# Patient Record
Sex: FEMALE | Race: WHITE | NOT HISPANIC OR LATINO | Employment: OTHER | URBAN - METROPOLITAN AREA
[De-identification: names, ages, dates, MRNs, and addresses within clinical notes are randomized per-mention and may not be internally consistent; named-entity substitution may affect disease eponyms.]

---

## 2017-06-09 LAB — HCV AB SER-ACNC: NEGATIVE

## 2019-06-26 RX ORDER — MAG HYDROX/ALUMINUM HYD/SIMETH 400-400-40
5000 SUSPENSION, ORAL (FINAL DOSE FORM) ORAL DAILY
COMMUNITY
End: 2019-09-17 | Stop reason: ALTCHOICE

## 2019-06-26 RX ORDER — MULTIVITAMIN
1 TABLET ORAL DAILY
COMMUNITY

## 2019-06-26 RX ORDER — ALENDRONATE SODIUM 70 MG/1
70 TABLET ORAL WEEKLY
COMMUNITY
Start: 2019-01-27

## 2019-06-26 RX ORDER — ASCORBATE CALCIUM 500 MG
500 TABLET ORAL DAILY
COMMUNITY
End: 2019-06-28

## 2019-06-26 RX ORDER — CALCIUM CARBONATE 500(1250)
500 TABLET ORAL DAILY
COMMUNITY

## 2019-06-26 RX ORDER — CHOLECALCIFEROL (VITAMIN D3) 125 MCG
CAPSULE ORAL
COMMUNITY
End: 2019-06-28

## 2019-06-26 RX ORDER — PHENOL 1.4 %
AEROSOL, SPRAY (ML) MUCOUS MEMBRANE
COMMUNITY
End: 2019-06-28

## 2019-06-26 RX ORDER — LEVOTHYROXINE SODIUM 88 UG/1
88 TABLET ORAL DAILY
COMMUNITY
Start: 2013-12-19

## 2019-06-28 ENCOUNTER — OFFICE VISIT (OUTPATIENT)
Dept: FAMILY MEDICINE CLINIC | Facility: CLINIC | Age: 74
End: 2019-06-28
Payer: MEDICARE

## 2019-06-28 ENCOUNTER — TELEPHONE (OUTPATIENT)
Dept: FAMILY MEDICINE CLINIC | Facility: CLINIC | Age: 74
End: 2019-06-28

## 2019-06-28 VITALS
OXYGEN SATURATION: 98 % | WEIGHT: 121.2 LBS | TEMPERATURE: 97.3 F | HEIGHT: 63 IN | HEART RATE: 87 BPM | BODY MASS INDEX: 21.48 KG/M2 | DIASTOLIC BLOOD PRESSURE: 80 MMHG | RESPIRATION RATE: 16 BRPM | SYSTOLIC BLOOD PRESSURE: 132 MMHG

## 2019-06-28 DIAGNOSIS — D64.89 ANEMIA DUE TO OTHER CAUSE, NOT CLASSIFIED: ICD-10-CM

## 2019-06-28 DIAGNOSIS — E03.9 HYPOTHYROIDISM, UNSPECIFIED TYPE: ICD-10-CM

## 2019-06-28 DIAGNOSIS — R73.01 IMPAIRED FASTING GLUCOSE: ICD-10-CM

## 2019-06-28 DIAGNOSIS — Z13.220 SCREENING FOR LIPOID DISORDERS: ICD-10-CM

## 2019-06-28 DIAGNOSIS — Z00.00 MEDICARE ANNUAL WELLNESS VISIT, SUBSEQUENT: Primary | ICD-10-CM

## 2019-06-28 PROBLEM — I31.39 PERICARDIAL EFFUSION: Status: ACTIVE | Noted: 2018-01-24

## 2019-06-28 PROBLEM — I31.3 PERICARDIAL EFFUSION: Status: ACTIVE | Noted: 2018-01-24

## 2019-06-28 PROCEDURE — G0439 PPPS, SUBSEQ VISIT: HCPCS | Performed by: FAMILY MEDICINE

## 2019-06-28 RX ORDER — TRAZODONE HYDROCHLORIDE 50 MG/1
1 TABLET ORAL AS NEEDED
COMMUNITY
End: 2019-06-28

## 2019-06-28 RX ORDER — LUTEIN 6 MG
20 TABLET ORAL DAILY
COMMUNITY

## 2019-09-16 ENCOUNTER — ANESTHESIA EVENT (OUTPATIENT)
Dept: PERIOP | Facility: HOSPITAL | Age: 74
End: 2019-09-16
Payer: MEDICARE

## 2019-09-16 RX ORDER — SODIUM CHLORIDE 9 MG/ML
125 INJECTION, SOLUTION INTRAVENOUS CONTINUOUS
Status: CANCELLED | OUTPATIENT
Start: 2019-10-02

## 2019-09-17 ENCOUNTER — APPOINTMENT (OUTPATIENT)
Dept: PREADMISSION TESTING | Facility: HOSPITAL | Age: 74
End: 2019-09-17
Payer: MEDICARE

## 2019-09-17 ENCOUNTER — TRANSCRIBE ORDERS (OUTPATIENT)
Dept: ADMINISTRATIVE | Facility: HOSPITAL | Age: 74
End: 2019-09-17

## 2019-09-17 ENCOUNTER — HOSPITAL ENCOUNTER (OUTPATIENT)
Dept: NON INVASIVE DIAGNOSTICS | Facility: HOSPITAL | Age: 74
Discharge: HOME | End: 2019-09-17
Attending: SURGERY
Payer: MEDICARE

## 2019-09-17 ENCOUNTER — APPOINTMENT (OUTPATIENT)
Dept: LAB | Facility: HOSPITAL | Age: 74
End: 2019-09-17
Attending: SURGERY
Payer: MEDICARE

## 2019-09-17 DIAGNOSIS — C44.311 BASAL CELL CARCINOMA OF SKIN OF NOSE: ICD-10-CM

## 2019-09-17 DIAGNOSIS — Z01.812 PRE-OPERATIVE LABORATORY EXAMINATION: ICD-10-CM

## 2019-09-17 DIAGNOSIS — Z01.812 PRE-OPERATIVE LABORATORY EXAMINATION: Primary | ICD-10-CM

## 2019-09-17 LAB
ANION GAP SERPL CALCULATED.3IONS-SCNC: 7 MMOL/L (ref 4–13)
ATRIAL RATE: 67 BPM
BUN SERPL-MCNC: 15 MG/DL (ref 5–25)
CALCIUM SERPL-MCNC: 9.8 MG/DL (ref 8.3–10.1)
CHLORIDE SERPL-SCNC: 102 MMOL/L (ref 100–108)
CO2 SERPL-SCNC: 32 MMOL/L (ref 21–32)
CREAT SERPL-MCNC: 0.7 MG/DL (ref 0.6–1.3)
ERYTHROCYTE [DISTWIDTH] IN BLOOD BY AUTOMATED COUNT: 15.5 % (ref 11.6–15.1)
GFR SERPL CREATININE-BSD FRML MDRD: 87 ML/MIN/1.73SQ M
GLUCOSE SERPL-MCNC: 90 MG/DL (ref 65–140)
HCT VFR BLD AUTO: 45.2 % (ref 34.8–46.1)
HGB BLD-MCNC: 14.3 G/DL (ref 11.5–15.4)
MCH RBC QN AUTO: 30.6 PG (ref 26.8–34.3)
MCHC RBC AUTO-ENTMCNC: 31.6 G/DL (ref 31.4–37.4)
MCV RBC AUTO: 97 FL (ref 82–98)
P AXIS: -84 DEGREES
PLATELET # BLD AUTO: 223 THOUSANDS/UL (ref 149–390)
PMV BLD AUTO: 10.5 FL (ref 8.9–12.7)
POTASSIUM SERPL-SCNC: 4.1 MMOL/L (ref 3.5–5.3)
PR INTERVAL: 152 MS
QRS AXIS: 7 DEGREES
QRSD INTERVAL: 80 MS
QT INTERVAL: 392 MS
QTC INTERVAL: 414 MS
RBC # BLD AUTO: 4.68 MILLION/UL (ref 3.81–5.12)
SODIUM SERPL-SCNC: 141 MMOL/L (ref 136–145)
T WAVE AXIS: 31 DEGREES
VENTRICULAR RATE: 67 BPM
WBC # BLD AUTO: 6.64 THOUSAND/UL (ref 4.31–10.16)

## 2019-09-17 PROCEDURE — 85027 COMPLETE CBC AUTOMATED: CPT

## 2019-09-17 PROCEDURE — 93005 ELECTROCARDIOGRAM TRACING: CPT

## 2019-09-17 PROCEDURE — 80048 BASIC METABOLIC PNL TOTAL CA: CPT

## 2019-09-17 PROCEDURE — 93010 ELECTROCARDIOGRAM REPORT: CPT | Performed by: INTERNAL MEDICINE

## 2019-09-17 PROCEDURE — 36415 COLL VENOUS BLD VENIPUNCTURE: CPT

## 2019-09-17 RX ORDER — CHOLECALCIFEROL (VITAMIN D3) 50 MCG
2000 TABLET ORAL DAILY
COMMUNITY

## 2019-09-17 RX ORDER — ACETAMINOPHEN 500 MG
500 TABLET ORAL EVERY 6 HOURS PRN
COMMUNITY

## 2019-09-17 NOTE — PRE-PROCEDURE INSTRUCTIONS
Pre-Surgery Instructions:   Medication Instructions    acetaminophen (TYLENOL) 500 mg tablet Patient was instructed by Physician and understands   alendronate (FOSAMAX) 70 mg tablet Patient was instructed by Physician and understands   Calcium (RA CALCIUM) 500 MG tablet Patient was instructed by Physician and understands   Cholecalciferol (VITAMIN D) 2000 units tablet Patient was instructed by Physician and understands   levothyroxine (SYNTHROID) 88 mcg tablet Patient was instructed by Physician and understands   Lutein 20 MG TABS Patient was instructed by Physician and understands   Multiple Vitamin (MULTI-VITAMIN DAILY) TABS Patient was instructed by Physician and understands  Patient seen by Sheila Maxwell instructed *to take*synthroid with a sip of water the morning of surgery  Patient given/ instructed on use of chlorhexidine soap per hospital protocol    Patient instructed to stop all ASA, NSAIDS, vitamins and herbal supplements one week prior to surgery or per Dr Anne-Marie Montenegro

## 2019-09-17 NOTE — ANESTHESIA PREPROCEDURE EVALUATION
Review of Systems/Medical History  Patient summary reviewed  Chart reviewed  No history of anesthetic complications     Cardiovascular  Valvular heart disease , mitral valve prolapse, CAD ,   Comment: Hx pericarditis  HPI:  I had the pleasure of seeing George Fernandez in follow up today for her prior syncopal event, noncompressive pericardial effusion, and mitral valve prolapse with mild MR  She was previously hospitalized after having a syncopal event after donating blood  Her syncopal event only lasted a few seconds  The time she had her event, she was witnessed to be pale, felt lightheaded dizzy and weak  She an inpatient evaluation that did not showed any other reason for event other than a vagal event and possibly some low intravascular volume  Since our visit last year she has been doing very well from cardiac standpoint  She denies any further syncopal or near syncopal events  She denies any anginal chest pain, shortness of breath, palpitations, orthopnea, PND, or claudication  She recently underwent surgery on her left foot and is recovering from that now  Prior to her foot surgery, she was exercising rather routinely with both aerobic, yoga, and weight training and without difficulty  EKG: Normal left ventricular chamber size  Normal left ventricular systolic     function  Normal regional wall motion  Normal left ventricular wall     thickness  Estimated left ventricular ejection fraction is 65%       Normal right ventricular size and function       Small anterior pericardial effusion without evidence of hemodynamic     compromise      Mild mitral annular calcification  Thickened mitral valve leaflets  Evidence     of mild posterior leaflet prolapse  No mitral stenosis  Mild mitral     regurgitation      Mild tricuspid regurgitation      Mild diastolic dysfunction with normal filling pressures      Visually Estimated LV Ejection Fraction is:65% ,  Pulmonary  Smoker ex-smoker  , GI/Hepatic  Negative GI/hepatic ROS          Negative  ROS        Endo/Other  History of thyroid disease (thyroidectomy for CA) , hypothyroidism,      GYN  Negative gynecology ROS          Hematology  Negative hematology ROS      Musculoskeletal       Neurology  Negative neurology ROS      Psychology   Negative psychology ROS              Physical Exam    Airway    Mallampati score: II  TM Distance: >3 FB  Neck ROM: full     Dental   No notable dental hx     Cardiovascular  Rhythm: regular, Rate: normal,     Pulmonary  Pulmonary exam normal Breath sounds clear to auscultation,     Other Findings        Anesthesia Plan  ASA Score- 2     Anesthesia Type- general with ASA Monitors  Additional Monitors:   Airway Plan:         Plan Factors-Patient not instructed to abstain from smoking on day of procedure  Patient did not smoke on day of surgery  Induction- intravenous  Postoperative Plan-     Informed Consent- Anesthetic plan and risks discussed with patient

## 2019-10-02 ENCOUNTER — ANESTHESIA (OUTPATIENT)
Dept: PERIOP | Facility: HOSPITAL | Age: 74
End: 2019-10-02
Payer: MEDICARE

## 2019-10-02 ENCOUNTER — HOSPITAL ENCOUNTER (OUTPATIENT)
Facility: HOSPITAL | Age: 74
Setting detail: OUTPATIENT SURGERY
Discharge: HOME | End: 2019-10-02
Attending: SURGERY | Admitting: SURGERY
Payer: MEDICARE

## 2019-10-02 VITALS
OXYGEN SATURATION: 98 % | WEIGHT: 122 LBS | SYSTOLIC BLOOD PRESSURE: 141 MMHG | TEMPERATURE: 97.5 F | RESPIRATION RATE: 18 BRPM | HEIGHT: 63 IN | HEART RATE: 70 BPM | BODY MASS INDEX: 21.62 KG/M2 | DIASTOLIC BLOOD PRESSURE: 74 MMHG

## 2019-10-02 DIAGNOSIS — C44.311 BASAL CELL CARCINOMA (BCC) OF SUPRATIP OF NOSE: Primary | ICD-10-CM

## 2019-10-02 RX ORDER — HYDROCODONE BITARTRATE AND ACETAMINOPHEN 5; 325 MG/1; MG/1
2 TABLET ORAL EVERY 4 HOURS PRN
Status: DISCONTINUED | OUTPATIENT
Start: 2019-10-02 | End: 2019-10-02 | Stop reason: HOSPADM

## 2019-10-02 RX ORDER — CEFAZOLIN SODIUM 1 G/50ML
1000 SOLUTION INTRAVENOUS ONCE
Status: COMPLETED | OUTPATIENT
Start: 2019-10-02 | End: 2019-10-02

## 2019-10-02 RX ORDER — HYDROCODONE BITARTRATE AND ACETAMINOPHEN 5; 325 MG/1; MG/1
1 TABLET ORAL EVERY 6 HOURS PRN
Qty: 20 TABLET | Refills: 0 | Status: SHIPPED | OUTPATIENT
Start: 2019-10-02 | End: 2019-10-12

## 2019-10-02 RX ORDER — HYDROCODONE BITARTRATE AND ACETAMINOPHEN 5; 325 MG/1; MG/1
1 TABLET ORAL EVERY 4 HOURS PRN
Status: DISCONTINUED | OUTPATIENT
Start: 2019-10-02 | End: 2019-10-02 | Stop reason: HOSPADM

## 2019-10-02 RX ORDER — HYDROCODONE BITARTRATE AND ACETAMINOPHEN 5; 325 MG/1; MG/1
1 TABLET ORAL EVERY 4 HOURS PRN
Status: DISCONTINUED | OUTPATIENT
Start: 2019-10-02 | End: 2019-10-02

## 2019-10-02 RX ORDER — FENTANYL CITRATE 50 UG/ML
INJECTION, SOLUTION INTRAMUSCULAR; INTRAVENOUS AS NEEDED
Status: DISCONTINUED | OUTPATIENT
Start: 2019-10-02 | End: 2019-10-02 | Stop reason: SURG

## 2019-10-02 RX ORDER — PROPOFOL 10 MG/ML
INJECTION, EMULSION INTRAVENOUS AS NEEDED
Status: DISCONTINUED | OUTPATIENT
Start: 2019-10-02 | End: 2019-10-02 | Stop reason: SURG

## 2019-10-02 RX ORDER — ONDANSETRON 2 MG/ML
INJECTION INTRAMUSCULAR; INTRAVENOUS AS NEEDED
Status: DISCONTINUED | OUTPATIENT
Start: 2019-10-02 | End: 2019-10-02 | Stop reason: SURG

## 2019-10-02 RX ORDER — FENTANYL CITRATE/PF 50 MCG/ML
50 SYRINGE (ML) INJECTION
Status: DISCONTINUED | OUTPATIENT
Start: 2019-10-02 | End: 2019-10-02 | Stop reason: HOSPADM

## 2019-10-02 RX ORDER — DEXAMETHASONE SODIUM PHOSPHATE 4 MG/ML
INJECTION, SOLUTION INTRA-ARTICULAR; INTRALESIONAL; INTRAMUSCULAR; INTRAVENOUS; SOFT TISSUE AS NEEDED
Status: DISCONTINUED | OUTPATIENT
Start: 2019-10-02 | End: 2019-10-02 | Stop reason: SURG

## 2019-10-02 RX ORDER — LIDOCAINE HYDROCHLORIDE 20 MG/ML
INJECTION, SOLUTION EPIDURAL; INFILTRATION; INTRACAUDAL; PERINEURAL AS NEEDED
Status: DISCONTINUED | OUTPATIENT
Start: 2019-10-02 | End: 2019-10-02 | Stop reason: SURG

## 2019-10-02 RX ORDER — EPHEDRINE SULFATE 50 MG/ML
INJECTION INTRAVENOUS AS NEEDED
Status: DISCONTINUED | OUTPATIENT
Start: 2019-10-02 | End: 2019-10-02 | Stop reason: SURG

## 2019-10-02 RX ORDER — CEPHALEXIN 500 MG/1
500 CAPSULE ORAL
Qty: 30 CAPSULE | Refills: 0 | Status: SHIPPED | OUTPATIENT
Start: 2019-10-02 | End: 2019-10-12

## 2019-10-02 RX ORDER — LIDOCAINE HYDROCHLORIDE AND EPINEPHRINE 10; 10 MG/ML; UG/ML
INJECTION, SOLUTION INFILTRATION; PERINEURAL AS NEEDED
Status: DISCONTINUED | OUTPATIENT
Start: 2019-10-02 | End: 2019-10-02 | Stop reason: HOSPADM

## 2019-10-02 RX ORDER — SODIUM CHLORIDE 9 MG/ML
125 INJECTION, SOLUTION INTRAVENOUS CONTINUOUS
Status: DISCONTINUED | OUTPATIENT
Start: 2019-10-02 | End: 2019-10-02 | Stop reason: HOSPADM

## 2019-10-02 RX ORDER — HYDROCODONE BITARTRATE AND ACETAMINOPHEN 5; 325 MG/1; MG/1
2 TABLET ORAL EVERY 4 HOURS PRN
Status: DISCONTINUED | OUTPATIENT
Start: 2019-10-02 | End: 2019-10-02

## 2019-10-02 RX ORDER — MIDAZOLAM HYDROCHLORIDE 1 MG/ML
INJECTION INTRAMUSCULAR; INTRAVENOUS AS NEEDED
Status: DISCONTINUED | OUTPATIENT
Start: 2019-10-02 | End: 2019-10-02 | Stop reason: SURG

## 2019-10-02 RX ORDER — ONDANSETRON 2 MG/ML
4 INJECTION INTRAMUSCULAR; INTRAVENOUS ONCE AS NEEDED
Status: DISCONTINUED | OUTPATIENT
Start: 2019-10-02 | End: 2019-10-02 | Stop reason: HOSPADM

## 2019-10-02 RX ORDER — GINSENG 100 MG
CAPSULE ORAL AS NEEDED
Status: DISCONTINUED | OUTPATIENT
Start: 2019-10-02 | End: 2019-10-02 | Stop reason: HOSPADM

## 2019-10-02 RX ADMIN — SODIUM CHLORIDE 125 ML/HR: 0.9 INJECTION, SOLUTION INTRAVENOUS at 12:36

## 2019-10-02 RX ADMIN — LIDOCAINE HYDROCHLORIDE 100 MG: 20 INJECTION, SOLUTION EPIDURAL; INFILTRATION; INTRACAUDAL; PERINEURAL at 13:35

## 2019-10-02 RX ADMIN — MIDAZOLAM 1 MG: 1 INJECTION INTRAMUSCULAR; INTRAVENOUS at 13:30

## 2019-10-02 RX ADMIN — CEFAZOLIN SODIUM 1000 MG: 1 SOLUTION INTRAVENOUS at 13:21

## 2019-10-02 RX ADMIN — DEXAMETHASONE SODIUM PHOSPHATE 6 MG: 4 INJECTION, SOLUTION INTRAMUSCULAR; INTRAVENOUS at 13:54

## 2019-10-02 RX ADMIN — PHENYLEPHRINE HYDROCHLORIDE 100 MCG: 10 INJECTION INTRAVENOUS at 14:02

## 2019-10-02 RX ADMIN — SODIUM CHLORIDE: 0.9 INJECTION, SOLUTION INTRAVENOUS at 14:30

## 2019-10-02 RX ADMIN — EPHEDRINE SULFATE 5 MG: 50 INJECTION, SOLUTION INTRAVENOUS at 14:11

## 2019-10-02 RX ADMIN — PHENYLEPHRINE HYDROCHLORIDE 100 MCG: 10 INJECTION INTRAVENOUS at 14:12

## 2019-10-02 RX ADMIN — PHENYLEPHRINE HYDROCHLORIDE 100 MCG: 10 INJECTION INTRAVENOUS at 14:06

## 2019-10-02 RX ADMIN — FENTANYL CITRATE 50 MCG: 50 INJECTION, SOLUTION INTRAMUSCULAR; INTRAVENOUS at 13:44

## 2019-10-02 RX ADMIN — MIDAZOLAM 1 MG: 1 INJECTION INTRAMUSCULAR; INTRAVENOUS at 13:34

## 2019-10-02 RX ADMIN — FENTANYL CITRATE 25 MCG: 50 INJECTION, SOLUTION INTRAMUSCULAR; INTRAVENOUS at 13:34

## 2019-10-02 RX ADMIN — PROPOFOL 200 MG: 10 INJECTION, EMULSION INTRAVENOUS at 13:35

## 2019-10-02 RX ADMIN — ONDANSETRON 4 MG: 2 INJECTION INTRAMUSCULAR; INTRAVENOUS at 13:54

## 2019-10-02 NOTE — ANESTHESIA POSTPROCEDURE EVALUATION
Post-Op Assessment Note    CV Status:  Stable  Pain Score: 2    Pain management: adequate     Mental Status:  Alert and awake   Hydration Status:  Euvolemic and stable   PONV Controlled:  Controlled   Airway Patency:  Patent   Post Op Vitals Reviewed: Yes      Staff: Anesthesiologist           /71 (10/02/19 1528)    Temp      Pulse 76 (10/02/19 1528)   Resp 18 (10/02/19 1528)    SpO2 97 % (10/02/19 1528)

## 2019-10-02 NOTE — INTERVAL H&P NOTE
H&P reviewed  After examining the patient I find no changes in the patients condition since the H&P had been written      Vitals:    10/02/19 1224   BP: 160/80   Pulse: 80   Resp: 16   Temp: 97 7 °F (36 5 °C)   SpO2: 100%

## 2019-10-02 NOTE — DISCHARGE INSTRUCTIONS
[facility]  Postoperative Instructions for Outpatient Surgery  [address] [facility], [address] [facility], [address] [facility], [facility], [address] [facility] Megan / Gopi Marino  / www [email or web address]      These  instructions are being provided by you doctor to give you basic guidelines during you post-op recovery  Please let our office know your contact information has changed  Please call the office today to schedule a post operative appointment, and tell the office staff  that you doctor needs see you in our office in 7-10 days  Dressing:          Keep nose dressing dry    Apply ice as needed       Bathing  Keep dressing dry nose    Showering permitted              Medication    Resume preparative medication  Skin glue was applied to area  Motrin or Tylenol is OK    Other Instruction:        Activities  No bending , lifting, or straining    You may drive when you are off you pain medications  Walking permitted  Bruising, and welling I expected  incision and surrounding area  It is normal to have a slight fever after surgery  If the fever I above 101 5 please call our office  If you have a drain, leaking around the drain it may occur  The normal  Occasionally, a drain may clog  If this happens carefully remove the bulb and try milking the obstruction  out of the tubing  Garments after liposuction will become soiled  You should protect area where you will sitting or lying  The majority of the drainage should subside within 48 hrs  Do Not remove garment unless otherwise instructed  A side effect of the pain medication is constipation  If this dose happen your doctor recommends that you take Senokot, Colace or something over the counter for this  Do not hesitate to call the office at [phone] if you have any questions about your surgery  The nursing staff will be glad to assist you in any possible way   If it is necessary for you to contract a doctor when the office is closed or on the weekend, please call [phone] and it will direct you to the answering service  A physician will contact you to assist you with any problems or questions  Cephalexin (By mouth)   Cephalexin (iwr-b-BGY-in)  Treats infections  This medicine is a cephalosporin antibiotic  Brand Name(s): Daxbia, Keflex   There may be other brand names for this medicine  When This Medicine Should Not Be Used: This medicine is not right for everyone  Do not use this medicine if you had an allergic reaction to cephalexin or another cephalosporin medicine  How to Use This Medicine:   Capsule, Tablet, Tablet for Suspension, Liquid  · Your doctor will tell you how much medicine to use  Do not use more than directed  · Read and follow the patient instructions that come with this medicine  Talk to your doctor or pharmacist if you have any questions  · You may take your medicine with food or milk to avoid stomach upset  · Oral liquid: Shake well just before use  Measure the oral liquid medicine with a marked measuring spoon, oral syringe, or medicine cup  · Take all of the medicine in your prescription to clear up your infection, even if you feel better after the first few doses  · Missed dose: Take a dose as soon as you remember  If it is almost time for your next dose, wait until then and take a regular dose  Do not take extra medicine to make up for a missed dose  · Capsule or tablet: Store at room temperature away from heat, moisture, and direct light  · Oral liquid: Store in the refrigerator for 14 days  After 14 days, throw away any unused medicine  Do not freeze  Drugs and Foods to Avoid:   Ask your doctor or pharmacist before using any other medicine, including over-the-counter medicines, vitamins, and herbal products  · Some medicines and foods can affect how cephalexin works   Tell your doctor if you are also using  ¨ Metformin  ¨ Probenecid  Warnings While Using This Medicine:   · Tell your doctor if you are pregnant or breastfeeding, or if you have kidney disease, liver disease, or a history of digestive problems, such as colitis  Tell your doctor if you are allergic to penicillin  · This medicine can cause diarrhea  Call your doctor if the diarrhea becomes severe, does not stop, or is bloody  Do not take any medicine to stop diarrhea until you have talked to your doctor  Diarrhea can occur 2 months or more after you stop taking this medicine  · Tell any doctor or dentist who treats you that you are using this medicine  This medicine may affect certain medical test results  · Call your doctor if your symptoms do not improve or if they get worse  · Keep all medicine out of the reach of children  Never share your medicine with anyone  Possible Side Effects While Using This Medicine:   Call your doctor right away if you notice any of these side effects:  · Allergic reaction: Itching or hives, swelling in your face or hands, swelling or tingling in your mouth or throat, chest tightness, trouble breathing  · Blistering, peeling, red skin rash  · Severe diarrhea, especially if bloody or ongoing  · Severe stomach pain, vomiting  If you notice these less serious side effects, talk with your doctor:   · Mild diarrhea or nausea  If you notice other side effects that you think are caused by this medicine, tell your doctor  Call your doctor for medical advice about side effects  You may report side effects to FDA at 1-711-FDA-3092  © 2017 [facility] Information is for End User's use only and may not be sold, redistributed or otherwise used for commercial purposes  The above information is an  only  It is not intended as medical advice for individual conditions or treatments  Talk to your doctor, nurse or pharmacist before following any medical regimen to see if it is safe and effective for you        Hydrocodone/Acetaminophen (By mouth)   Acetaminophen (a-uxix-o-MIN-oh-fen), Hydrocodone Bitartrate (wau-crty-ROO-done bye-TAR-trate)  Treats pain  This medicine contains a narcotic pain reliever  Brand Name(s): Hycet, Lorcet, Lorcet HD, Lorcet Plus, Lortab 10/325, Lortab 5/325, Lortab 7 5/325, Lortab Elixir, Norco, Verdrocet, Vicodin, Vicodin ES, Vicodin HP, Xodol, Xodol 5/300   There may be other brand names for this medicine  When This Medicine Should Not Be Used: This medicine is not right for everyone  Do not use it if you had an allergic reaction to acetaminophen, hydrocodone, or other narcotic medicines, or stomach or bowel blockage (including paralytic ileus)  How to Use This Medicine:   Capsule, Liquid, Tablet  · Your doctor will tell you how much medicine to use  Do not use more than directed  · An overdose can be dangerous  Follow directions carefully so you do not get too much medicine at one time  · Oral liquid: Measure the oral liquid medicine with a marked measuring spoon, oral syringe, or medicine cup  · Drink plenty of liquids to help avoid constipation  · This medicine should come with a Medication Guide  Ask your pharmacist for a copy if you do not have one  · Missed dose: Take a dose as soon as you remember  If it is almost time for your next dose, wait until then and take a regular dose  Do not take extra medicine to make up for a missed dose  · Store the medicine in a closed container at room temperature, away from heat, moisture, and direct light  Flush any unused Norco® tablets down the toilet  Drugs and Foods to Avoid:   Ask your doctor or pharmacist before using any other medicine, including over-the-counter medicines, vitamins, and herbal products  · Do not use this medicine if you are using or have used an MAO inhibitor within the past 14 days  · Some medicines can affect how hydrocodone/acetaminophen works   Tell your doctor if you are using any of the following:   ¨ Carbamazepine, erythromycin, ketoconazole, mirtazapine, phenytoin, rifampin, ritonavir, tramadol, trazodone  ¨ Diuretic (water pill)  ¨ Medicine to treat depression or mental health problems  ¨ Medicine to treat migraine headaches  ¨ Phenothiazine medicine  · Tell your doctor if you use anything else that makes you sleepy  Some examples are allergy medicine, narcotic pain medicine, and alcohol  Tell your doctor if you are using buprenorphine, butorphanol, nalbuphine, pentazocine, or a muscle relaxer  · Do not drink alcohol while you are using this medicine  Acetaminophen can damage your liver, and your risk is higher if you also drink alcohol  Warnings While Using This Medicine:   · Tell your doctor if you are pregnant or breastfeeding, or if you have kidney disease, liver disease, lung or breathing problems, gallbladder or pancreas problems, an underactive thyroid, Davidson disease, prostate problems, trouble urinating, stomach problems, or a history of head injury or brain tumor, seizures, alcohol or drug addiction  · This medicine may cause the following problems:   ¨ High risk of overdose, which can lead to death  ¨ Respiratory depression (serious breathing problem that can be life-threatening)  ¨ Liver problems  ¨ Serious skin reactions  ¨ Serotonin syndrome (when used with certain medicines)  · This medicine can be habit-forming  Do not use more than your prescribed dose  Call your doctor if you think your medicine is not working  · This medicine may make you dizzy or drowsy  Do not drive or doing anything else that could be dangerous until you know how this medicine affects you  · This medicine contains acetaminophen  Read the labels of all other medicines you are using to see if they also contain acetaminophen, or ask your doctor or pharmacist  Milli Gonzalez not use more than 4 grams (4,000 milligrams) total of acetaminophen in one day  · Tell any doctor or dentist who treats you that you are using this medicine   This medicine may affect certain medical test results  · This medicine may cause constipation, especially with long-term use  Ask your doctor if you should use a laxative to prevent and treat constipation  · This medicine could cause infertility  Talk with your doctor before using this medicine if you plan to have children  · Keep all medicine out of the reach of children  Never share your medicine with anyone  Possible Side Effects While Using This Medicine:   Call your doctor right away if you notice any of these side effects:  · Allergic reaction: Itching or hives, swelling in your face or hands, swelling or tingling in your mouth or throat, chest tightness, trouble breathing  · Anxiety, restlessness, fast heartbeat, fever, sweating, muscle spasms, twitching, diarrhea, seeing or hearing things that are not there  · Blistering, peeling, red skin rash  · Blue lips, fingernails, or skin  · Dark urine or pale stools, loss of appetite, nausea or vomiting, stomach pain, yellow skin or eyes  · Extreme weakness, shallow breathing, slow heartbeat, sweating, seizures, cold or clammy skin  · Lightheadedness, dizziness, fainting  If you notice these less serious side effects, talk with your doctor:   · Constipation, nausea, vomiting  · Tiredness or sleepiness  If you notice other side effects that you think are caused by this medicine, tell your doctor  Call your doctor for medical advice about side effects  You may report side effects to FDA at 2-907-FDA-2601  © 2017 [address] Information is for End User's use only and may not be sold, redistributed or otherwise used for commercial purposes  The above information is an  only  It is not intended as medical advice for individual conditions or treatments  Talk to your doctor, nurse or pharmacist before following any medical regimen to see if it is safe and effective for you

## 2019-10-02 NOTE — DISCHARGE SUMMARY
PLASTIC, RECONSTRUCTIVE, & HAND SURGERY   Discharge Summary  Date of Admission:   10/2/2019  Date of Discharge:   10/02/19  Attending:  Gabino Gaviria MD  Principal/Final Diagnosis:   Basal cell carcinoma of skin of nose [C44 311]  Principal Procedure:   NOSE MOHS RECONSTRUCTION (N/A Nose)  Discharge Medications:  See after visit summary for reconciled discharge medications provided to patient and family  Reason for Admission:  Juliann Emery was electively admitted to undergo the above named procedure on 10/2/2019 as an outpatient  Hospital Course:  Patient underwent the above named procedure on the day of admission without complications  They were discharged home the same day  Disposition:  To home in care of self and family    Condition:  Good  Follow up:  Patient with follow up in the office with Dr Gabino Gaviria MD in 1 week(s) or as scheduled per his office  Gabino Gaviria MD  10/2/2019 1:13 PM

## 2019-10-02 NOTE — OP NOTE
OPERATIVE REPORT  PATIENT NAME: Xenia Connell    :  1945  MRN: [id number]  Pt Location: [facility] OR ROOM 05    SURGERY DATE: 10/2/2019    Surgeon(s) and Role:     * Zulma Cole MD - Primary     * Yousuf Primer - Assisting    Preop Diagnosis:  Basal cell carcinoma of skin of nose [C44 311]    Post-Op Diagnosis Codes: * Basal cell carcinoma of skin of nose [C44 311]    Procedure(s) (LRB):  NOSE MOHS RECONSTRUCTION (N/A)   I&D left lateral nose defect secondary from mohs 1 5 x 1 0 cm  Reconstruction with left cheek flap based on lateral nasal artery  Specimen(s):  * No specimens in log *    Estimated Blood Loss:   Minimal    Drains:  * No LDAs found *    Anesthesia Type:   Choice    Operative Indications:  Basal cell carcinoma of skin of nose [C44 311]  Mohs defect  1 5 x 1 0 cm    Operative Findings:  none    Complications:   None    Procedure and Technique:  Patient identified correctly on the table  Intubated by anesthesia  Prepped and draped in sterile surgical fashion  First started by cleaning the wound by removing the old dressing  We then debrided the skin edges well as the bed with 15 blade and electrocautery  Total size of the defect measured 1 5 x 1 cm  At this point we marked out a left cheek flap  Incisions were made along the nasal labial line and then superiorly onto the nose  Dissection occurred sub SMAS along the left nose and cheek until we identified the left lateral nasal artery  The artery was dissected out and kept intact with our skin and fascial flap  At this point the entire flap was elevated and inset into the defect  This was then closed with 3-0, 4-0 PDS, 6-0 Prolene  Patient was dressed with antibiotic ointment  We had good symmetry of the nose  All counts were correct x2  The patient was awakened from surgery taken recovery     I was present for the entire procedure    Patient Disposition:  PACU  room stable condition      SIGNATURE: Zulma Cole MD  DATE: October 2, 2019  TIME: 2:29 PM

## 2020-01-28 ENCOUNTER — TELEPHONE (OUTPATIENT)
Dept: FAMILY MEDICINE CLINIC | Facility: CLINIC | Age: 75
End: 2020-01-28

## 2020-01-28 NOTE — TELEPHONE ENCOUNTER
Spoke to patient and obtained the below Pre-op information:    Name of procedure: L foot surgery  Date of procedure (within 27 days):3/13/20  Surgeon: Dr Kimberli Morales  Location of procedure (hospital or out patient facility name): [facility] date/location/ type (Which labs? EKG?  CXR?): lab to be done, no EKG  Records (on epic or requested): EPIC  Type of anaesthesia:  general  Paperwork to complete for Pre-op (patient bringing vs  calling office to obtain prior to appointment): pt to bring form  Pre-op appointment scheduled (date/time): 3/3/20

## 2020-02-25 ENCOUNTER — OFFICE VISIT (OUTPATIENT)
Dept: FAMILY MEDICINE CLINIC | Facility: CLINIC | Age: 75
End: 2020-02-25
Payer: MEDICARE

## 2020-02-25 VITALS
TEMPERATURE: 98.7 F | RESPIRATION RATE: 14 BRPM | DIASTOLIC BLOOD PRESSURE: 84 MMHG | HEART RATE: 82 BPM | BODY MASS INDEX: 22.89 KG/M2 | WEIGHT: 129.2 LBS | OXYGEN SATURATION: 98 % | SYSTOLIC BLOOD PRESSURE: 136 MMHG | HEIGHT: 63 IN

## 2020-02-25 DIAGNOSIS — I05.9 MITRAL VALVE DISORDER: ICD-10-CM

## 2020-02-25 DIAGNOSIS — M81.0 AGE-RELATED OSTEOPOROSIS WITHOUT CURRENT PATHOLOGICAL FRACTURE: ICD-10-CM

## 2020-02-25 DIAGNOSIS — Z01.818 PREOPERATIVE CLEARANCE: Primary | ICD-10-CM

## 2020-02-25 DIAGNOSIS — M21.962 ACQUIRED DEFORMITY OF LEFT FOOT: ICD-10-CM

## 2020-02-25 PROCEDURE — 99214 OFFICE O/P EST MOD 30 MIN: CPT | Performed by: FAMILY MEDICINE

## 2020-02-25 PROCEDURE — 1036F TOBACCO NON-USER: CPT | Performed by: FAMILY MEDICINE

## 2020-02-25 PROCEDURE — 1160F RVW MEDS BY RX/DR IN RCRD: CPT | Performed by: FAMILY MEDICINE

## 2020-02-25 PROCEDURE — 4040F PNEUMOC VAC/ADMIN/RCVD: CPT | Performed by: FAMILY MEDICINE

## 2020-02-25 PROCEDURE — 3008F BODY MASS INDEX DOCD: CPT | Performed by: FAMILY MEDICINE

## 2020-02-25 RX ORDER — NICOTINE POLACRILEX 2 MG
1 GUM BUCCAL DAILY
COMMUNITY
End: 2021-05-14 | Stop reason: ALTCHOICE

## 2020-02-25 NOTE — H&P (VIEW-ONLY)
Assessment/Plan:    No problem-specific Assessment & Plan notes found for this encounter  Diagnoses and all orders for this visit:    Preoperative clearance  Comments:  labs reviewed  pt asymptomatic from CV standpoing  COTO risk 0 18%  pt is clear for upcoming surgery    Acquired deformity of left foot  Comments:  mgmt per Dr Unique Boggs    Age-related osteoporosis without current pathological fracture  Comments:  pt on fosamax under care of endo    Mitral valve disorder  Comments:  stable per most recent cardiac eval    Other orders  -     Biotin 1 MG CAPS; Take 1 capsule by mouth daily        Subjective:      Patient ID: Daphne Sherman is a 69 y o  female  HPI    Pt is scheduled for L 2nd MTPJ exploration/removal of retained hardware and repair by Dr Unique Boggs at [address] [facility]  On 3/13 under general anesthesia  She is s/p surgical repair last year and had been doing fine until about the beginning of January when she began having swelling and pain in the foot  She used ice and decreased her activity to no avail  She saw Dr Unique Boggs who ordered xray  Results reviewed  He recommended exploration/revision/repair  In general, pt feels well  She denies chest pain, shortness of breath  She can walk up 2 flights with groceries without chest pain, shortness of breath  She can walk on a flat surface without being limited by chest pain, shortness of breath, calf pain          Past Medical History:   Diagnosis Date    Anesthesia     "after one foot surgery which was later in the day took longer to wake up "    Basal cell carcinoma of nose 10/2019    Carcinoma (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    Colon polyp     Coronary artery disease 2015    Mitral valve prolapse; pericardial effusion    Dental crowns present     one dental implant upper right    Exercises daily     Fibroid 1980     Hysterectomy     History of mitral valve prolapse     "no problems"    Hypothyroidism     post surgical    IFG (impaired fasting glucose)     s/p thyroidectomy and PALOMO    Osteoporosis     Pericardial effusion     "sees cardiologist regularly, being monitored and no symptoms found incidentally on an echo"    PID (pelvic inflammatory disease)     1970's    Prediabetes     Thyroid ca ([address] )     Tinnitus     Tuberculosis 1973    Pos PPD/INH x 1 yr    Uterus, adenomyosis     Varicella     Wears glasses      Past Surgical History:   Procedure Laterality Date    ABDOMINAL SURGERY  01/01/1980    adhesion removal      APPENDECTOMY      BRANCHIAL CLEFT CYST EXCISION  1980    CATARACT EXTRACTION Bilateral     COLECTOMY Right 03/2019    right ellis    COLON SURGERY  1980    for adhesions    FOOT NEUROMA SURGERY Right     R foot 2nd toes      FOOT SURGERY Left     MOHS RECONSTRUCTION N/A 10/2/2019    Procedure: NOSE MOHS RECONSTRUCTION;  Surgeon: Deric Diaz MD;  Location: [facility];  Service: Plastics    SKIN CANCER EXCISION      BCC    THYROIDECTOMY      thyroid cancer      TONSILLECTOMY AND ADENOIDECTOMY      TUBAL LIGATION      VAGINAL HYSTERECTOMY       Family History   Problem Relation Age of Onset    Heart disease Mother     Diabetes type II Father     Heart disease Father     Thyroid cancer Sister     Osteoarthritis Sister     Thyroid cancer Brother     Diabetes type II Brother     No Known Problems Son     No Known Problems Daughter      Social History     Socioeconomic History    Marital status: /Civil Union     Spouse name: Not on file    Number of children: Not on file    Years of education: Not on file    Highest education level: Not on file   Occupational History    Not on file   Social Needs    Financial resource strain: Not on file    Food insecurity:     Worry: Not on file     Inability: Not on file    Transportation needs:     Medical: Not on file     Non-medical: Not on file   Tobacco Use    Smoking status: Former Smoker     Packs/day: 2 00     Years: 8 00     Pack years: 16 00     Types: Cigarettes     Last attempt to quit: 1970     Years since quittin 1    Smokeless tobacco: Never Used    Tobacco comment: 1-2 packs a day    Substance and Sexual Activity    Alcohol use: Yes     Alcohol/week: 4 0 - 5 0 standard drinks     Types: 4 - 5 Glasses of wine per week     Frequency: 4 or more times a week     Drinks per session: 1 or 2     Binge frequency: Never     Comment: wine    Drug use: Never    Sexual activity: Yes     Partners: Male     Birth control/protection: Post-menopausal   Lifestyle    Physical activity:     Days per week: Not on file     Minutes per session: Not on file    Stress: Not on file   Relationships    Social connections:     Talks on phone: Not on file     Gets together: Not on file     Attends Jainism service: Not on file     Active member of club or organization: Not on file     Attends meetings of clubs or organizations: Not on file     Relationship status: Not on file    Intimate partner violence:     Fear of current or ex partner: Not on file     Emotionally abused: Not on file     Physically abused: Not on file     Forced sexual activity: Not on file   Other Topics Concern    Not on file   Social History Narrative    Not on file       The following portions of the patient's history were reviewed and updated as appropriate: allergies, current medications, past family history, past medical history, past social history, past surgical history and problem list     Review of Systems   Constitutional: Negative for chills, fatigue, fever and unexpected weight change  HENT: Negative for congestion, ear pain, hearing loss, postnasal drip, rhinorrhea, sinus pressure, sinus pain, sore throat, trouble swallowing and voice change  Eyes: Negative for pain, redness and visual disturbance  Respiratory: Negative for cough and shortness of breath  Cardiovascular: Negative for chest pain, palpitations and leg swelling  Gastrointestinal: Negative for abdominal pain, constipation, diarrhea and nausea  Endocrine: Negative for cold intolerance, heat intolerance, polydipsia and polyuria  Genitourinary: Negative for dysuria, frequency and urgency  Musculoskeletal: Positive for arthralgias and joint swelling  Negative for myalgias  Skin: Negative for rash  No suspicious lesions   Neurological: Negative for weakness, numbness and headaches  Hematological: Negative for adenopathy  Objective:      /84   Pulse 82   Temp 98 7 °F (37 1 °C) (Tympanic)   Resp 14   Ht 5' 3" (1 6 m)   Wt 58 6 kg (129 lb 3 2 oz)   SpO2 98%   BMI 22 89 kg/m²          Physical Exam   Constitutional: She is oriented to person, place, and time  She appears well-developed and well-nourished  No distress  HENT:   Head: Normocephalic and atraumatic  Right Ear: Tympanic membrane, external ear and ear canal normal    Left Ear: Tympanic membrane, external ear and ear canal normal    Nose: Nose normal    Mouth/Throat: Oropharynx is clear and moist and mucous membranes are normal  No oropharyngeal exudate  Eyes: Pupils are equal, round, and reactive to light  Conjunctivae and EOM are normal    Neck: No JVD present  Carotid bruit is not present  No thyromegaly present  Cardiovascular: Regular rhythm, S1 normal and S2 normal  Exam reveals no gallop, no S3, no S4 and no friction rub  No murmur heard  Pulmonary/Chest: Effort normal and breath sounds normal  She has no wheezes  She has no rhonchi  She has no rales  Abdominal: Soft  Bowel sounds are normal  She exhibits no distension  There is no tenderness  Lymphadenopathy:     She has no cervical adenopathy  Neurological: She is alert and oriented to person, place, and time  She has normal strength and normal reflexes  No cranial nerve deficit or sensory deficit

## 2020-02-25 NOTE — PROGRESS NOTES
Assessment/Plan:    No problem-specific Assessment & Plan notes found for this encounter  Diagnoses and all orders for this visit:    Preoperative clearance  Comments:  labs reviewed  pt asymptomatic from CV standpoing  COTO risk 0 18%  pt is clear for upcoming surgery    Acquired deformity of left foot  Comments:  mgmt per Dr Miko Cervantes    Age-related osteoporosis without current pathological fracture  Comments:  pt on fosamax under care of endo    Mitral valve disorder  Comments:  stable per most recent cardiac eval    Other orders  -     Biotin 1 MG CAPS; Take 1 capsule by mouth daily        Subjective:      Patient ID: Patric Andrew is a 69 y o  female  HPI    Pt is scheduled for L 2nd MTPJ exploration/removal of retained hardware and repair by Dr Miko Cervantes at [facility]  On 3/13 under general anesthesia  She is s/p surgical repair last year and had been doing fine until about the beginning of January when she began having swelling and pain in the foot  She used ice and decreased her activity to no avail  She saw Dr Miko Cervantes who ordered xray  Results reviewed  He recommended exploration/revision/repair  In general, pt feels well  She denies chest pain, shortness of breath  She can walk up 2 flights with groceries without chest pain, shortness of breath  She can walk on a flat surface without being limited by chest pain, shortness of breath, calf pain          Past Medical History:   Diagnosis Date    Anesthesia     "after one foot surgery which was later in the day took longer to wake up "    Basal cell carcinoma of nose 10/2019    Carcinoma ([facility] [address] ) 1980    basil cell left ear 1990 and head 2013    Cataract 2013     Removal - lens implants     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/2019    Colon polyp     Coronary artery disease 2015    Mitral valve prolapse; pericardial effusion    Dental crowns present     one dental implant upper right    Exercises daily     Fibroid 1980     Hysterectomy     History of mitral valve prolapse     "no problems"    Hypothyroidism     post surgical    IFG (impaired fasting glucose)     s/p thyroidectomy and PALOMO    Osteoporosis     Pericardial effusion     "sees cardiologist regularly, being monitored and no symptoms found incidentally on an echo"    PID (pelvic inflammatory disease)     1970's    Prediabetes     Thyroid ca ([address] )     Tinnitus     Tuberculosis 1973    Pos PPD/INH x 1 yr    Uterus, adenomyosis     Varicella     Wears glasses      Past Surgical History:   Procedure Laterality Date    ABDOMINAL SURGERY  01/01/1980    adhesion removal      APPENDECTOMY      BRANCHIAL CLEFT CYST EXCISION  1980    CATARACT EXTRACTION Bilateral     COLECTOMY Right 03/2019    right ellis    COLON SURGERY  1980    for adhesions    FOOT NEUROMA SURGERY Right     R foot 2nd toes      FOOT SURGERY Left     MOHS RECONSTRUCTION N/A 10/2/2019    Procedure: NOSE MOHS RECONSTRUCTION;  Surgeon: Dariusz Leonardo MD;  Location: [facility];  Service: Plastics    SKIN CANCER EXCISION      BCC    THYROIDECTOMY      thyroid cancer      TONSILLECTOMY AND ADENOIDECTOMY      TUBAL LIGATION      VAGINAL HYSTERECTOMY       Family History   Problem Relation Age of Onset    Heart disease Mother     Diabetes type II Father     Heart disease Father     Thyroid cancer Sister     Osteoarthritis Sister     Thyroid cancer Brother     Diabetes type II Brother     No Known Problems Son     No Known Problems Daughter      Social History     Socioeconomic History    Marital status: /Civil Union     Spouse name: Not on file    Number of children: Not on file    Years of education: Not on file    Highest education level: Not on file   Occupational History    Not on file   Social Needs    Financial resource strain: Not on file    Food insecurity:     Worry: Not on file     Inability: Not on file    Transportation needs:     Medical: Not on file     Non-medical: Not on file   Tobacco Use    Smoking status: Former Smoker     Packs/day: 2 00     Years: 8 00     Pack years: 16 00     Types: Cigarettes     Last attempt to quit: 1970     Years since quittin 1    Smokeless tobacco: Never Used    Tobacco comment: 1-2 packs a day    Substance and Sexual Activity    Alcohol use: Yes     Alcohol/week: 4 0 - 5 0 standard drinks     Types: 4 - 5 Glasses of wine per week     Frequency: 4 or more times a week     Drinks per session: 1 or 2     Binge frequency: Never     Comment: wine    Drug use: Never    Sexual activity: Yes     Partners: Male     Birth control/protection: Post-menopausal   Lifestyle    Physical activity:     Days per week: Not on file     Minutes per session: Not on file    Stress: Not on file   Relationships    Social connections:     Talks on phone: Not on file     Gets together: Not on file     Attends Zoroastrianism service: Not on file     Active member of club or organization: Not on file     Attends meetings of clubs or organizations: Not on file     Relationship status: Not on file    Intimate partner violence:     Fear of current or ex partner: Not on file     Emotionally abused: Not on file     Physically abused: Not on file     Forced sexual activity: Not on file   Other Topics Concern    Not on file   Social History Narrative    Not on file       The following portions of the patient's history were reviewed and updated as appropriate: allergies, current medications, past family history, past medical history, past social history, past surgical history and problem list     Review of Systems   Constitutional: Negative for chills, fatigue, fever and unexpected weight change  HENT: Negative for congestion, ear pain, hearing loss, postnasal drip, rhinorrhea, sinus pressure, sinus pain, sore throat, trouble swallowing and voice change  Eyes: Negative for pain, redness and visual disturbance  Respiratory: Negative for cough and shortness of breath  Cardiovascular: Negative for chest pain, palpitations and leg swelling  Gastrointestinal: Negative for abdominal pain, constipation, diarrhea and nausea  Endocrine: Negative for cold intolerance, heat intolerance, polydipsia and polyuria  Genitourinary: Negative for dysuria, frequency and urgency  Musculoskeletal: Positive for arthralgias and joint swelling  Negative for myalgias  Skin: Negative for rash  No suspicious lesions   Neurological: Negative for weakness, numbness and headaches  Hematological: Negative for adenopathy  Objective:      /84   Pulse 82   Temp 98 7 °F (37 1 °C) (Tympanic)   Resp 14   Ht 5' 3" (1 6 m)   Wt 58 6 kg (129 lb 3 2 oz)   SpO2 98%   BMI 22 89 kg/m²          Physical Exam   Constitutional: She is oriented to person, place, and time  She appears well-developed and well-nourished  No distress  HENT:   Head: Normocephalic and atraumatic  Right Ear: Tympanic membrane, external ear and ear canal normal    Left Ear: Tympanic membrane, external ear and ear canal normal    Nose: Nose normal    Mouth/Throat: Oropharynx is clear and moist and mucous membranes are normal  No oropharyngeal exudate  Eyes: Pupils are equal, round, and reactive to light  Conjunctivae and EOM are normal    Neck: No JVD present  Carotid bruit is not present  No thyromegaly present  Cardiovascular: Regular rhythm, S1 normal and S2 normal  Exam reveals no gallop, no S3, no S4 and no friction rub  No murmur heard  Pulmonary/Chest: Effort normal and breath sounds normal  She has no wheezes  She has no rhonchi  She has no rales  Abdominal: Soft  Bowel sounds are normal  She exhibits no distension  There is no tenderness  Lymphadenopathy:     She has no cervical adenopathy  Neurological: She is alert and oriented to person, place, and time  She has normal strength and normal reflexes  No cranial nerve deficit or sensory deficit

## 2020-03-10 NOTE — PRE-PROCEDURE INSTRUCTIONS
Pre-Surgery Instructions:   Medication Instructions    acetaminophen (TYLENOL) 500 mg tablet Patient was instructed by Physician and understands   alendronate (FOSAMAX) 70 mg tablet Patient was instructed by Physician and understands   Biotin 1 MG CAPS Patient was instructed by Physician and understands   Calcium (RA CALCIUM) 500 MG tablet Patient was instructed by Physician and understands   Cholecalciferol (VITAMIN D) 2000 units tablet Patient was instructed by Physician and understands   levothyroxine (SYNTHROID) 88 mcg tablet Patient was instructed by Physician and understands   Lutein 20 MG TABS Patient was instructed by Physician and understands   Multiple Vitamin (MULTI-VITAMIN DAILY) TABS Patient was instructed by Physician and understands  Pt instructed to take levothyroxine with a small sip of water the morning of surgery  [facility] preop instructions reviewed with pt  Pt will get antibacterial soap

## 2020-03-12 ENCOUNTER — ANESTHESIA EVENT (OUTPATIENT)
Dept: PERIOP | Facility: HOSPITAL | Age: 75
End: 2020-03-12
Payer: MEDICARE

## 2020-03-13 ENCOUNTER — ANESTHESIA (OUTPATIENT)
Dept: PERIOP | Facility: HOSPITAL | Age: 75
End: 2020-03-13
Payer: MEDICARE

## 2020-03-13 ENCOUNTER — APPOINTMENT (OUTPATIENT)
Dept: RADIOLOGY | Facility: HOSPITAL | Age: 75
End: 2020-03-13
Payer: MEDICARE

## 2020-03-13 ENCOUNTER — HOSPITAL ENCOUNTER (OUTPATIENT)
Facility: HOSPITAL | Age: 75
Setting detail: OUTPATIENT SURGERY
Discharge: HOME | End: 2020-03-13
Attending: PODIATRIST | Admitting: PODIATRIST
Payer: MEDICARE

## 2020-03-13 VITALS
HEIGHT: 63 IN | DIASTOLIC BLOOD PRESSURE: 62 MMHG | RESPIRATION RATE: 16 BRPM | TEMPERATURE: 97.4 F | SYSTOLIC BLOOD PRESSURE: 103 MMHG | OXYGEN SATURATION: 99 % | BODY MASS INDEX: 21.79 KG/M2 | WEIGHT: 123 LBS | HEART RATE: 84 BPM

## 2020-03-13 DIAGNOSIS — Z98.890 POSTOPERATIVE STATE: Primary | ICD-10-CM

## 2020-03-13 DIAGNOSIS — M79.672 PAIN IN LEFT FOOT: ICD-10-CM

## 2020-03-13 PROCEDURE — C1713 ANCHOR/SCREW BN/BN,TIS/BN: HCPCS | Performed by: PODIATRIST

## 2020-03-13 PROCEDURE — C1769 GUIDE WIRE: HCPCS | Performed by: PODIATRIST

## 2020-03-13 PROCEDURE — 73630 X-RAY EXAM OF FOOT: CPT

## 2020-03-13 PROCEDURE — 73620 X-RAY EXAM OF FOOT: CPT

## 2020-03-13 PROCEDURE — C9290 INJ, BUPIVACAINE LIPOSOME: HCPCS | Performed by: STUDENT IN AN ORGANIZED HEALTH CARE EDUCATION/TRAINING PROGRAM

## 2020-03-13 DEVICE — SCREW COMP 3.5 X 30MM MINI FT: Type: IMPLANTABLE DEVICE | Site: FOOT | Status: FUNCTIONAL

## 2020-03-13 DEVICE — TENO SCRW,BIO-COMP
Type: IMPLANTABLE DEVICE | Site: FOOT | Status: FUNCTIONAL
Brand: ARTHREX®

## 2020-03-13 DEVICE — K-WIRE TROCAR POINT BOTH ENDS .045                                    X 4: Type: IMPLANTABLE DEVICE | Site: FOOT | Status: FUNCTIONAL

## 2020-03-13 DEVICE — HAMMERLOCK2 15MM PROXIMAL – 5MM DISTAL IMPLANT
Type: IMPLANTABLE DEVICE | Site: FOOT | Status: FUNCTIONAL
Brand: HAMMERLOCK2

## 2020-03-13 DEVICE — SCREW CORT 2 X 12MM QUICKFIX SML JOINT: Type: IMPLANTABLE DEVICE | Site: FOOT | Status: FUNCTIONAL

## 2020-03-13 RX ORDER — KETOROLAC TROMETHAMINE 30 MG/ML
INJECTION, SOLUTION INTRAMUSCULAR; INTRAVENOUS AS NEEDED
Status: DISCONTINUED | OUTPATIENT
Start: 2020-03-13 | End: 2020-03-13 | Stop reason: SURG

## 2020-03-13 RX ORDER — BUPIVACAINE HYDROCHLORIDE 5 MG/ML
INJECTION, SOLUTION PERINEURAL AS NEEDED
Status: DISCONTINUED | OUTPATIENT
Start: 2020-03-13 | End: 2020-03-13 | Stop reason: HOSPADM

## 2020-03-13 RX ORDER — ONDANSETRON 2 MG/ML
INJECTION INTRAMUSCULAR; INTRAVENOUS AS NEEDED
Status: DISCONTINUED | OUTPATIENT
Start: 2020-03-13 | End: 2020-03-13 | Stop reason: SURG

## 2020-03-13 RX ORDER — ONDANSETRON 2 MG/ML
4 INJECTION INTRAMUSCULAR; INTRAVENOUS ONCE AS NEEDED
Status: DISCONTINUED | OUTPATIENT
Start: 2020-03-13 | End: 2020-03-13 | Stop reason: HOSPADM

## 2020-03-13 RX ORDER — MAGNESIUM HYDROXIDE 1200 MG/15ML
LIQUID ORAL AS NEEDED
Status: DISCONTINUED | OUTPATIENT
Start: 2020-03-13 | End: 2020-03-13 | Stop reason: HOSPADM

## 2020-03-13 RX ORDER — HYDROCODONE BITARTRATE AND ACETAMINOPHEN 5; 325 MG/1; MG/1
1 TABLET ORAL EVERY 6 HOURS PRN
Qty: 6 TABLET | Refills: 0 | Status: SHIPPED | OUTPATIENT
Start: 2020-03-13 | End: 2020-05-15 | Stop reason: ALTCHOICE

## 2020-03-13 RX ORDER — MIDAZOLAM HYDROCHLORIDE 2 MG/2ML
INJECTION, SOLUTION INTRAMUSCULAR; INTRAVENOUS AS NEEDED
Status: DISCONTINUED | OUTPATIENT
Start: 2020-03-13 | End: 2020-03-13 | Stop reason: SURG

## 2020-03-13 RX ORDER — HYDROCODONE BITARTRATE AND ACETAMINOPHEN 5; 325 MG/1; MG/1
1 TABLET ORAL EVERY 6 HOURS PRN
Status: DISCONTINUED | OUTPATIENT
Start: 2020-03-13 | End: 2020-03-13 | Stop reason: HOSPADM

## 2020-03-13 RX ORDER — EPHEDRINE SULFATE 50 MG/ML
INJECTION INTRAVENOUS AS NEEDED
Status: DISCONTINUED | OUTPATIENT
Start: 2020-03-13 | End: 2020-03-13 | Stop reason: SURG

## 2020-03-13 RX ORDER — HYDROCODONE BITARTRATE AND ACETAMINOPHEN 5; 325 MG/1; MG/1
1 TABLET ORAL EVERY 4 HOURS PRN
Qty: 30 TABLET | Refills: 0 | Status: SHIPPED | OUTPATIENT
Start: 2020-03-13 | End: 2020-03-23

## 2020-03-13 RX ORDER — FENTANYL CITRATE/PF 50 MCG/ML
25 SYRINGE (ML) INJECTION
Status: DISCONTINUED | OUTPATIENT
Start: 2020-03-13 | End: 2020-03-13 | Stop reason: HOSPADM

## 2020-03-13 RX ORDER — CEFAZOLIN SODIUM 1 G/50ML
1000 SOLUTION INTRAVENOUS ONCE
Status: COMPLETED | OUTPATIENT
Start: 2020-03-13 | End: 2020-03-13

## 2020-03-13 RX ORDER — FENTANYL CITRATE 50 UG/ML
INJECTION, SOLUTION INTRAMUSCULAR; INTRAVENOUS AS NEEDED
Status: DISCONTINUED | OUTPATIENT
Start: 2020-03-13 | End: 2020-03-13 | Stop reason: SURG

## 2020-03-13 RX ORDER — LIDOCAINE HYDROCHLORIDE 20 MG/ML
INJECTION, SOLUTION EPIDURAL; INFILTRATION; INTRACAUDAL; PERINEURAL AS NEEDED
Status: DISCONTINUED | OUTPATIENT
Start: 2020-03-13 | End: 2020-03-13 | Stop reason: SURG

## 2020-03-13 RX ORDER — PROPOFOL 10 MG/ML
INJECTION, EMULSION INTRAVENOUS AS NEEDED
Status: DISCONTINUED | OUTPATIENT
Start: 2020-03-13 | End: 2020-03-13 | Stop reason: SURG

## 2020-03-13 RX ORDER — DEXAMETHASONE SODIUM PHOSPHATE 4 MG/ML
INJECTION, SOLUTION INTRA-ARTICULAR; INTRALESIONAL; INTRAMUSCULAR; INTRAVENOUS; SOFT TISSUE AS NEEDED
Status: DISCONTINUED | OUTPATIENT
Start: 2020-03-13 | End: 2020-03-13 | Stop reason: SURG

## 2020-03-13 RX ORDER — SODIUM CHLORIDE 9 MG/ML
125 INJECTION, SOLUTION INTRAVENOUS CONTINUOUS
Status: DISCONTINUED | OUTPATIENT
Start: 2020-03-13 | End: 2020-03-13 | Stop reason: HOSPADM

## 2020-03-13 RX ADMIN — FENTANYL CITRATE 25 MCG: 50 INJECTION, SOLUTION INTRAMUSCULAR; INTRAVENOUS at 10:06

## 2020-03-13 RX ADMIN — EPHEDRINE SULFATE 5 MG: 50 INJECTION, SOLUTION INTRAVENOUS at 08:39

## 2020-03-13 RX ADMIN — DEXAMETHASONE SODIUM PHOSPHATE 4 MG: 4 INJECTION, SOLUTION INTRAMUSCULAR; INTRAVENOUS at 08:00

## 2020-03-13 RX ADMIN — LIDOCAINE HYDROCHLORIDE 80 MG: 20 INJECTION, SOLUTION EPIDURAL; INFILTRATION; INTRACAUDAL; PERINEURAL at 07:44

## 2020-03-13 RX ADMIN — SODIUM CHLORIDE 125 ML/HR: 0.9 INJECTION, SOLUTION INTRAVENOUS at 06:34

## 2020-03-13 RX ADMIN — PROPOFOL 150 MG: 10 INJECTION, EMULSION INTRAVENOUS at 07:44

## 2020-03-13 RX ADMIN — EPHEDRINE SULFATE 10 MG: 50 INJECTION, SOLUTION INTRAVENOUS at 07:56

## 2020-03-13 RX ADMIN — FENTANYL CITRATE 50 MCG: 50 INJECTION, SOLUTION INTRAMUSCULAR; INTRAVENOUS at 07:38

## 2020-03-13 RX ADMIN — EPHEDRINE SULFATE 5 MG: 50 INJECTION, SOLUTION INTRAVENOUS at 08:52

## 2020-03-13 RX ADMIN — SODIUM CHLORIDE: 0.9 INJECTION, SOLUTION INTRAVENOUS at 08:07

## 2020-03-13 RX ADMIN — KETOROLAC TROMETHAMINE 15 MG: 30 INJECTION, SOLUTION INTRAMUSCULAR at 10:06

## 2020-03-13 RX ADMIN — EPHEDRINE SULFATE 10 MG: 50 INJECTION, SOLUTION INTRAVENOUS at 08:16

## 2020-03-13 RX ADMIN — MIDAZOLAM 2 MG: 1 INJECTION INTRAMUSCULAR; INTRAVENOUS at 07:38

## 2020-03-13 RX ADMIN — CEFAZOLIN SODIUM 1000 MG: 1 SOLUTION INTRAVENOUS at 07:25

## 2020-03-13 RX ADMIN — ONDANSETRON 4 MG: 2 INJECTION INTRAMUSCULAR; INTRAVENOUS at 08:00

## 2020-03-13 NOTE — ANESTHESIA PREPROCEDURE EVALUATION
Review of Systems/Medical History  Patient summary reviewed  Chart reviewed      Cardiovascular  Exercise tolerance (METS): >4,     Pulmonary  Negative pulmonary ROS        GI/Hepatic  Negative GI/hepatic ROS          Negative  ROS        Endo/Other  History of thyroid disease , hypothyroidism,      GYN  Negative gynecology ROS          Hematology  Negative hematology ROS      Musculoskeletal  Negative musculoskeletal ROS        Neurology  Negative neurology ROS      Psychology   Negative psychology ROS              Physical Exam    Airway    Mallampati score: II  TM Distance: <3 FB  Neck ROM: full     Dental       Cardiovascular  Rhythm: regular, Rate: normal,     Pulmonary  Breath sounds clear to auscultation,     Other Findings        Anesthesia Plan  ASA Score- 2     Anesthesia Type- general with ASA Monitors  Additional Monitors:   Airway Plan:         Plan Factors-Patient not instructed to abstain from smoking on day of procedure  Patient did not smoke on day of surgery  Induction- intravenous  Postoperative Plan-     Informed Consent- Anesthetic plan and risks discussed with patient

## 2020-03-13 NOTE — INTERVAL H&P NOTE
H&P reviewed  After examining the patient I find no changes in the patients condition since the H&P had been written      Vitals:    03/13/20 0604   BP: 152/83   Pulse: 73   Resp: 16   Temp: (!) 96 2 °F (35 7 °C)   SpO2: 98%

## 2020-03-13 NOTE — ANESTHESIA POSTPROCEDURE EVALUATION
Post-Op Assessment Note    CV Status:  Stable  Pain Score: 2    Pain management: adequate     Mental Status:  Alert and awake   Hydration Status:  Euvolemic and stable   PONV Controlled:  Controlled   Airway Patency:  Patent   Post Op Vitals Reviewed: Yes      Staff: Anesthesiologist           BP      Temp     Pulse     Resp      SpO2

## 2020-03-13 NOTE — DISCHARGE INSTRUCTIONS
[facility]  [facility]  Dr Teodora Israel  1)  Cold Pack: Apply a cold pack for 45 - Minutes intervals just above the surgical site for the initial 24-48 hours  Never place on the toes  If a cast has been applied  Apply the cold pack to the thigh or knee area  2)  Elevation and Rest: Keep the foot elevated at least as high as the hips for the first 24-48 hours  It would be beneficial for the foot to be elevated when you are sitting during the first 7-10 days  Elevating the foot above the heart level will help control post operative pain and swelling  3)  Medication: Take prescription as prescribed by your physician  If you have any difficulty or side effects with the medication, stop taking immediately and notify your physician at once  Medications given today:     Vicodin    Protection of Surgical Site/ Assistive Devices:  a) You will be given one of the following:  O partial weight bearing with crutches for support in surgical shoe or CAM boot  4) DO NOT GET THE [address]  Keep the bandage clean, dry and intact until your follow-up appointment  5) If you had a peripheral nerve block performed by anesthesia department the numbness and loss of motor function of the extremity can last anywhere from 12-24 hours and sometimes a little bit longer    General Information  1)  Bleeding: some bleeding through the bandage is normal  If bleeding persists, you may attempt to reinforce the existing bandage while following the above instruction  If bleeding persists, notify the physician  2)  Temperature: if your temperature rises [id number] 5 degree, call the office [phone]  3)  Problem: If you notice increasing swelling and/ or pain 2 to 3 days following surgery , please notify    If a splint has been applied and you experience pain to extent the medication prescribed for pain does not seem to be effective you should remove the ace wraps and remove the splint if applied and allow about one half hour for relief then reapply splint and ace wraps  Should pain persist then please notify your physician  4)  Redressing: call the office the day following surgery to schedule a redressing  Pain: Within the first 24 hours following surgery, if your pain is not controlled sufficiently with pain medication, please check that your bandage is not too tight  You may loosen the badge without removing it  Wait 30 minutes, if your pain is not relieved     Please call the office [phone]

## 2020-03-13 NOTE — OP NOTE
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  Retained deep hardware left foot  Contracture, flexor toes 3,4 left foot      Procedure(s) (LRB):  2ND MTPJ EXPLOR  W/ REMOVAL  DEEP RETAINED HARDWARE;   2ND MET OSTEOTOMY, SHORTENING  FUSION 2ND TOE,   LEFT 1ST MET OSTEOTOMY FOR BUNION CORRECTION  FLEXOR CONTRACTURE RELEASE 3RD AND 4TH TOES (Left)  FDL TENDON TRANSFER TO SECOND DIGIT (Left)    Specimen(s):  * No specimens in log *    Estimated Blood Loss:   10 mL    Materials:  Implant Name Type Inv   Item Serial No   Lot No  LRB No  Used   IMPLANT TOE HAMMERLOCK 2 MEDUM - [id number]  IMPLANT TOE HAMMERLOCK 2 MEDUM  Synthe[employer] [id number] Left 1   SCREW INTFR 3 X 8MM W/HNDL INSRTR - [id number]  SCREW INTFR 3 X 8MM W/HNDL INSRTR  [employer] [id number] Left 1   K-WIRE 0 045 X 4 IN - [id number]  K-WIRE 0 045 X 4 IN  [facility] AND NEPHEW ORTHO [id number] Left 1   SCREW OSMAN 2 X 12MM QUICKFIX SML JOINT - [id number]  SCREW OSMAN 2 X 12MM QUICKFIX SML JOINT  [employer]  Left 1   SCREW COMP 3 5 X 30MM MINI FT - [id number]  SCREW COMP 3 5 X 30MM MINI FT  [employer]  Left 1   [employer] WIRE 0 062 IN ORTHO TROC TIP - [id number]  [employer] WIRE 0 062 IN ORTHO TROC TIP  [employer]  Left 2   2-0 fiberwire  2-0, 3-0 vicryl  4-0 nylon      Drains:  * No LDAs found *    Anesthesia Type:   General    Operative Indications:  Pain in left foot [id number]      Operative Findings:  C/w diagnosis    Complications:   None    Procedure and Technique:  Under mild sedation, the patient was brought into the operating room and placed on the operating room table in the supine position  A pneumatic ankle tourniquet was then placed around the patient's left ankle with ample webril padding  A time out was performed to confirm the correct patient, procedure and site with all parties in agreement  Following IV sedation, local anesthetic was obtained about the patient's foot was performed consisting of 20 ml of 1% Lidocaine and 0 5% Bupivacaine in a 1:1 mixture  The foot was then scrubbed, prepped and draped in the usual aseptic manner  An esmarch bandage was utilized to exsangunate the patients foot and the pneumatic ankle tourniquet was then inflated  The esmarch bandage was removed and the foot was placed on the operating room table  Attention was directed to lesser toes where adductovarus rotation was noted with third digit under-riding 2nd toe  By use of [place] blade flexor tenotomies with plantar joint release were performed to the 3rd and 4th digits of left foot  Adequate reduction was noted  Incision sites were then each closed with 4-0 nylon  Then, a 5-6 cm linear incision was effected from the 2nd PIPJ to proximal to the 2nd MPJ  With blunt and sharp dissection small vessels were ligated and or cauterized as needed  Dissection was carried to the level of the EDL tendon  The tendon was then transected at the PIPJ, and then the medial and lateral collateral ligaments were incised to expose the head of the proximal phalanx  The extensor tendon was sharply mobilized and elevated proximal to the MTPJ site  By use of sagittal saw, the head of the proximal phalanx and base of middle phalanx were resected  The flexor longus was then identified and sharply released      A linear capsulotomy was then performed at the metatarsalphalangeal joint and a mcgjosé miguel elevator was used to release adhesions off the plantar aspect of the metatarsal phalangeal joint  Large amount of scar tissue was noted from previous surgery  The 2nd metatarsal head also showed severe degenerative changes  Deep hardware was then isolated from the metatarsal head in toto (2 screws) to allow for osteotomy  A weil osteotomy was then fashioned, the 2nd metatarsal head was then pushed proximally to allow for shortening and visualization of the plantar plate/flexor complex with applied Arthrex distractor  Plantar plate was noted to be attenuated and large amount of capsular scarred adhesions noted  The flexor digitorum longus tendon slip was then identified and pulled through dorsally at MTPJ level, then using 2-0 fiberwire the tendon was controlled  A 3 2mm drill was then utilized to effect a bone tunnel from dorsal to plantar and with suture passer the flexor tendon was passed through drill hole from plantar to distal, and with adequate tension was secured within bone tunnel using a 3x8mm biotenodesis screw  The tendon was also sutured dorsally as second point of fixation using 2-0 vicryl th adjacent periosteal-capsular tissue  The weil osteotomy was then fixed with a 2 0x12mm snap off screw once the capital fragment was brought out to length and properly positioned with use of c-arm  Good sagittal plane stability was noted however transverse plane stability was not appreciated requiring pinning  An additional 0 045 kwire was then passed dorsally from proximal lateral to distal medial across 2nd MPJ under image for additional fixation to allow for transverse plane stability  At this time, attention was directed back to the 2nd toe  A synthes Hammerlock implant was used to maintain corrected positition of the hammertoe, inserting the the implant in the proximal and middle phalanx  The wound was then flushed with copious amounts of normal sterile solution   The extensor tendon was then reapproximated and maintained in a lengthened position with 3-0 Vicryl   The 2nd MTPJ capsule was closed with 3-0 vicryl  Subcutaneous tissue was then reapproximated with 3-0 Vicryl  Skin edges were reapproximated with 4-0 Nylon in a horizontal mattress suture technique  Attention was then directed to the dorsal aspect of the first metatarsal where an approximately 6 cm linear incision was made dorsomedially  The incision was deepened through the subcutaneous tissues using sharp and blunt dissection  Care was taken to identify and retract all vital neural and vascular structures  All bleeders were cauterized and ligated as necessary  A capsuloptomy was performed over the dorsal aspect of the MPJ  The periosteal and capsular structures were then carefully dissected free of their osseous attachments and reflected medially and laterally, thus exposing the head of the first metatarsal at the operative site  Attention was then directed to the 1st interspace via the original skin incision where the dissection was continued deep using sharp dissection down to the level of the fibular sesamoid which was free from its soft tissue attachments proximally, laterally and distally  The conjoined tendon of the adductor halluces was then released  At this time the lateral contraction presents on the hallux was noted to be reduced and the sesamoid apparatus was noted to float into a more corrected medial position  A through and through V type osteotomy was made at a 60 degree angle, the cut was created in the metataphyseal region of the bone utilizing a sagittal bone saw and the apices of this osteotomy pointing proximal distally  Upon completion of this osteotomy, the capital fragment was distracted and shifted laterally into a more corrected position and impacted onto the shaft of the first metatarsal with adduction   A guide wire was driven under image across the osteotomy as provisional fixation across the osteotomy site, then an arthrex 3 5x30mm screw served as fixation across the osteotomy site  Attention was directed to the remaining medial bone shelf proximal to the osteotomy site which was resected using a sagittal saw and passed from operative field  Correction of the deformity was assessed at this time and noted to be adequate  The wound was then flushed with copious amounts of sterile saline  The periosteal and capsular structures were reapproximated using 2-0 Vicryl  The subQ tissues were reapproximated using 3-0 Vicryl and the skin was reapproximated using 4-0 Nylon in a horizontal mattress suture technique  The incision site was then dressed with Xeroform, Dry sterile dressing  A postoperative injection consisting of 20 ml of exparel was performed  The pneumatic ankle tourniquet was deflated at 120mins and normal hyperemic flush was noted to all digits  The patient tolerated the procedure and anesthesia well and was transported to the PACU with vital signs stable               Patient Disposition:  PACU     SIGNATURE: Kezia Redman DPM  DATE: March 13, 2020  TIME: 10:22 AM

## 2020-03-13 NOTE — DISCHARGE SUMMARY
Discharge Summary Outpatient Procedure Podiatry -   Shabana Van Prokup 70 y o  female MRN: [id number]  Unit/Bed#: OR POOL Encounter: [id number]    Admission Date: 3/13/2020     Admitting Diagnosis: Pain in left foot [M79 672]    Discharge Diagnosis: same    Procedures Performed: 2ND MTPJ EXPLOR  W/ REMOVAL RETAINED HARDWARE; 2ND MET OSTEOTOMY, FUSION 2ND TOE, 1ST TOE AND MET OSTEOTOMY; FLEXOR RELEASE 3RD AND 4TH TOES:   FDL TENDON TRANSFER:     Complications: none    Condition at Discharge: stable    Discharge instructions/Information to patient and family:   See after visit summary for information provided to patient and family  Provisions for Follow-Up Care/Important appointments:  See after visit summary for information related to follow-up care and any pertinent home health orders  Discharge Medications:  See after visit summary for reconciled discharge medications provided to patient and family

## 2020-05-15 ENCOUNTER — TELEMEDICINE (OUTPATIENT)
Dept: FAMILY MEDICINE CLINIC | Facility: CLINIC | Age: 75
End: 2020-05-15
Payer: MEDICARE

## 2020-05-15 VITALS
DIASTOLIC BLOOD PRESSURE: 70 MMHG | BODY MASS INDEX: 21.79 KG/M2 | HEART RATE: 76 BPM | WEIGHT: 123 LBS | TEMPERATURE: 97.6 F | SYSTOLIC BLOOD PRESSURE: 110 MMHG | HEIGHT: 63 IN

## 2020-05-15 DIAGNOSIS — R30.0 DYSURIA: Primary | ICD-10-CM

## 2020-05-15 PROCEDURE — 99213 OFFICE O/P EST LOW 20 MIN: CPT | Performed by: FAMILY MEDICINE

## 2020-05-15 PROCEDURE — 1123F ACP DISCUSS/DSCN MKR DOCD: CPT | Performed by: FAMILY MEDICINE

## 2020-05-15 RX ORDER — CEPHALEXIN 500 MG/1
500 CAPSULE ORAL EVERY 12 HOURS SCHEDULED
Qty: 14 CAPSULE | Refills: 0 | Status: SHIPPED | OUTPATIENT
Start: 2020-05-15 | End: 2020-05-22

## 2020-05-15 RX ORDER — CEPHALEXIN 500 MG/1
500 CAPSULE ORAL EVERY 12 HOURS SCHEDULED
Qty: 14 CAPSULE | Refills: 0 | Status: SHIPPED | OUTPATIENT
Start: 2020-05-15 | End: 2020-05-15 | Stop reason: SDUPTHER

## 2020-05-20 ENCOUNTER — PATIENT MESSAGE (OUTPATIENT)
Dept: FAMILY MEDICINE CLINIC | Facility: CLINIC | Age: 75
End: 2020-05-20

## 2020-06-29 ENCOUNTER — TELEPHONE (OUTPATIENT)
Dept: FAMILY MEDICINE CLINIC | Facility: CLINIC | Age: 75
End: 2020-06-29

## 2020-06-30 ENCOUNTER — OFFICE VISIT (OUTPATIENT)
Dept: FAMILY MEDICINE CLINIC | Facility: CLINIC | Age: 75
End: 2020-06-30
Payer: MEDICARE

## 2020-06-30 VITALS
HEART RATE: 76 BPM | WEIGHT: 127.4 LBS | RESPIRATION RATE: 12 BRPM | HEIGHT: 63 IN | DIASTOLIC BLOOD PRESSURE: 60 MMHG | TEMPERATURE: 98 F | SYSTOLIC BLOOD PRESSURE: 120 MMHG | BODY MASS INDEX: 22.57 KG/M2 | OXYGEN SATURATION: 97 %

## 2020-06-30 DIAGNOSIS — R73.01 IMPAIRED FASTING GLUCOSE: ICD-10-CM

## 2020-06-30 DIAGNOSIS — Z00.00 MEDICARE ANNUAL WELLNESS VISIT, SUBSEQUENT: Primary | ICD-10-CM

## 2020-06-30 DIAGNOSIS — Z13.220 SCREENING FOR LIPOID DISORDERS: ICD-10-CM

## 2020-06-30 DIAGNOSIS — M81.0 AGE-RELATED OSTEOPOROSIS WITHOUT CURRENT PATHOLOGICAL FRACTURE: ICD-10-CM

## 2020-06-30 PROCEDURE — 4040F PNEUMOC VAC/ADMIN/RCVD: CPT | Performed by: FAMILY MEDICINE

## 2020-06-30 PROCEDURE — 1160F RVW MEDS BY RX/DR IN RCRD: CPT | Performed by: FAMILY MEDICINE

## 2020-06-30 PROCEDURE — G0439 PPPS, SUBSEQ VISIT: HCPCS | Performed by: FAMILY MEDICINE

## 2020-06-30 PROCEDURE — 3008F BODY MASS INDEX DOCD: CPT | Performed by: FAMILY MEDICINE

## 2020-06-30 PROCEDURE — 1125F AMNT PAIN NOTED PAIN PRSNT: CPT | Performed by: FAMILY MEDICINE

## 2020-06-30 PROCEDURE — 1170F FXNL STATUS ASSESSED: CPT | Performed by: FAMILY MEDICINE

## 2020-06-30 PROCEDURE — 1036F TOBACCO NON-USER: CPT | Performed by: FAMILY MEDICINE

## 2020-07-27 LAB — HBA1C MFR BLD HPLC: 6.1 %

## 2020-07-31 DIAGNOSIS — R79.89 ELEVATED SERUM CREATININE: Primary | ICD-10-CM

## 2020-11-19 ENCOUNTER — TELEPHONE (OUTPATIENT)
Dept: FAMILY MEDICINE CLINIC | Facility: CLINIC | Age: 75
End: 2020-11-19

## 2020-11-19 DIAGNOSIS — R30.0 DYSURIA: Primary | ICD-10-CM

## 2020-11-20 RX ORDER — CEPHALEXIN 500 MG/1
500 CAPSULE ORAL EVERY 12 HOURS SCHEDULED
Qty: 10 CAPSULE | Refills: 0 | Status: SHIPPED | OUTPATIENT
Start: 2020-11-20 | End: 2020-11-21 | Stop reason: RX

## 2020-11-21 ENCOUNTER — LAB (OUTPATIENT)
Dept: LAB | Facility: MEDICAL CENTER | Age: 75
End: 2020-11-21
Payer: MEDICARE

## 2020-11-21 ENCOUNTER — NURSE TRIAGE (OUTPATIENT)
Dept: OTHER | Facility: OTHER | Age: 75
End: 2020-11-21

## 2020-11-21 DIAGNOSIS — R30.0 DYSURIA: ICD-10-CM

## 2020-11-21 DIAGNOSIS — N39.0 URINARY TRACT INFECTION IN ELDERLY PATIENT: Primary | ICD-10-CM

## 2020-11-21 LAB
BACTERIA UR QL AUTO: ABNORMAL /HPF
BILIRUB UR QL STRIP: NEGATIVE
CLARITY UR: ABNORMAL
COLOR UR: YELLOW
GLUCOSE UR STRIP-MCNC: NEGATIVE MG/DL
HGB UR QL STRIP.AUTO: ABNORMAL
HYALINE CASTS #/AREA URNS LPF: ABNORMAL /LPF
KETONES UR STRIP-MCNC: NEGATIVE MG/DL
LEUKOCYTE ESTERASE UR QL STRIP: ABNORMAL
NITRITE UR QL STRIP: POSITIVE
NON-SQ EPI CELLS URNS QL MICRO: ABNORMAL /HPF
PH UR STRIP.AUTO: 6 [PH]
PROT UR STRIP-MCNC: ABNORMAL MG/DL
RBC #/AREA URNS AUTO: ABNORMAL /HPF
SP GR UR STRIP.AUTO: 1.01 (ref 1–1.03)
UROBILINOGEN UR QL STRIP.AUTO: 0.2 E.U./DL
WBC #/AREA URNS AUTO: ABNORMAL /HPF

## 2020-11-21 PROCEDURE — 87186 SC STD MICRODIL/AGAR DIL: CPT

## 2020-11-21 PROCEDURE — 81001 URINALYSIS AUTO W/SCOPE: CPT

## 2020-11-21 PROCEDURE — 87077 CULTURE AEROBIC IDENTIFY: CPT

## 2020-11-21 PROCEDURE — 87086 URINE CULTURE/COLONY COUNT: CPT

## 2020-11-21 RX ORDER — CEPHALEXIN 500 MG/1
500 CAPSULE ORAL EVERY 12 HOURS SCHEDULED
Qty: 10 CAPSULE | Refills: 0 | Status: SHIPPED | OUTPATIENT
Start: 2020-11-21 | End: 2020-11-21 | Stop reason: CLARIF

## 2020-11-23 LAB — BACTERIA UR CULT: ABNORMAL

## 2021-03-10 DIAGNOSIS — Z23 ENCOUNTER FOR IMMUNIZATION: ICD-10-CM

## 2021-05-14 ENCOUNTER — OFFICE VISIT (OUTPATIENT)
Dept: FAMILY MEDICINE CLINIC | Facility: CLINIC | Age: 76
End: 2021-05-14
Payer: MEDICARE

## 2021-05-14 VITALS
DIASTOLIC BLOOD PRESSURE: 72 MMHG | SYSTOLIC BLOOD PRESSURE: 130 MMHG | BODY MASS INDEX: 22.32 KG/M2 | TEMPERATURE: 98.4 F | WEIGHT: 126 LBS | RESPIRATION RATE: 16 BRPM | OXYGEN SATURATION: 98 % | HEART RATE: 73 BPM | HEIGHT: 63 IN

## 2021-05-14 DIAGNOSIS — Z12.31 VISIT FOR SCREENING MAMMOGRAM: ICD-10-CM

## 2021-05-14 DIAGNOSIS — H93.13 TINNITUS OF BOTH EARS: ICD-10-CM

## 2021-05-14 DIAGNOSIS — F51.01 PRIMARY INSOMNIA: ICD-10-CM

## 2021-05-14 DIAGNOSIS — M77.8 RIGHT SHOULDER TENDINITIS: Primary | ICD-10-CM

## 2021-05-14 PROCEDURE — 99214 OFFICE O/P EST MOD 30 MIN: CPT | Performed by: FAMILY MEDICINE

## 2021-05-14 RX ORDER — NITROFURANTOIN 25; 75 MG/1; MG/1
CAPSULE ORAL
COMMUNITY
End: 2021-05-14

## 2021-05-14 RX ORDER — TRAZODONE HYDROCHLORIDE 50 MG/1
50 TABLET ORAL
Qty: 30 TABLET | Refills: 1 | Status: SHIPPED | OUTPATIENT
Start: 2021-05-14 | End: 2021-08-31

## 2021-05-14 RX ORDER — SULFAMETHOXAZOLE AND TRIMETHOPRIM 800; 160 MG/1; MG/1
TABLET ORAL
COMMUNITY
End: 2021-05-14

## 2021-05-14 RX ORDER — ESTRADIOL 0.1 MG/G
CREAM VAGINAL
COMMUNITY

## 2021-05-14 RX ORDER — NAPROXEN 500 MG/1
500 TABLET ORAL 2 TIMES DAILY WITH MEALS
Qty: 60 TABLET | Refills: 0 | Status: SHIPPED | OUTPATIENT
Start: 2021-05-14 | End: 2021-12-06

## 2021-05-14 NOTE — PROGRESS NOTES
Assessment/Plan:    No problem-specific Assessment & Plan notes found for this encounter  Diagnoses and all orders for this visit:    Right shoulder tendinitis  Comments:  naproxen BID x 1 week, then prn  if no improvement, refer to sports med  Orders:  -     naproxen (NAPROSYN) 500 mg tablet; Take 1 tablet (500 mg total) by mouth 2 (two) times a day with meals    Primary insomnia  Comments:  discussed options  start trazodone nightly   call with update in a few weeks  Orders:  -     traZODone (DESYREL) 50 mg tablet; Take 1 tablet (50 mg total) by mouth daily at bedtime    Tinnitus of both ears  Comments:  refer to ENT for eval  Orders:  -     Ambulatory Referral to Otolaryngology; Future    Visit for screening mammogram  -     Mammo screening bilateral w 3d & cad; Future    Other orders  -     estradiol (ESTRACE) 0 1 mg/g vaginal cream; estradiol 0 01% (0 1 mg/gram) vaginal cream   APPLY A SMALL AMOUNT AT THE URETHRAL OPENING 3X A WEEK  -     Discontinue: nitrofurantoin (MACROBID) 100 mg capsule; nitrofurantoin monohydrate/macrocrystals 100 mg capsule  -     Discontinue: sulfamethoxazole-trimethoprim (BACTRIM DS) 800-160 mg per tablet; sulfamethoxazole 800 mg-trimethoprim 160 mg tablet   TAKE 1 TABLET (160 MG OF TRIMETHOPRIM TOTAL) BY MOUTH 2 (TWO) TIMES A DAY FOR 7 DAYS  Subjective:      Patient ID: Anastasia Luna is a 78 y o  female  HPI      Pt c/o R arm pain x 1 month  Doesn't hurt after golf or have pain when golfing  Not sure what brings it on  Sore/dull pain  Has FROM  No neck pain  No paresthesias or weakness  Pt c/o years of difficulty falling asleep and staying asleep  Melatonin has helped falling asleep but not staying so  Has tried sleep hygiene without any improvement  Tinnitus; this has been present x a year  Worsening  Non-pulsatile  Becoming more bothersome      The following portions of the patient's history were reviewed and updated as appropriate: allergies, current medications, past family history, past medical history, past social history, past surgical history and problem list     Review of Systems   Constitutional: Negative for chills, fatigue, fever and unexpected weight change  HENT: Negative for congestion, ear pain, hearing loss, postnasal drip, rhinorrhea, sinus pressure, sinus pain, sore throat, trouble swallowing and voice change  Eyes: Negative for pain, redness and visual disturbance  Respiratory: Negative for cough and shortness of breath  Cardiovascular: Negative for chest pain, palpitations and leg swelling  Gastrointestinal: Negative for abdominal pain, constipation, diarrhea and nausea  Endocrine: Negative for cold intolerance, heat intolerance, polydipsia and polyuria  Genitourinary: Negative for dysuria, frequency and urgency  Musculoskeletal: Negative for arthralgias, joint swelling and myalgias  Skin: Negative for rash  No suspicious lesions   Neurological: Negative for weakness, numbness and headaches  Hematological: Negative for adenopathy  Objective:      /72   Pulse 73   Temp 98 4 °F (36 9 °C)   Resp 16   Ht 5' 3" (1 6 m)   Wt 57 2 kg (126 lb)   SpO2 98%   BMI 22 32 kg/m²          Physical Exam  Constitutional:       General: She is not in acute distress  Appearance: She is well-developed  HENT:      Head: Normocephalic and atraumatic  Right Ear: Tympanic membrane, ear canal and external ear normal       Left Ear: Tympanic membrane, ear canal and external ear normal       Nose: Nose normal       Mouth/Throat:      Pharynx: No oropharyngeal exudate  Eyes:      Conjunctiva/sclera: Conjunctivae normal       Pupils: Pupils are equal, round, and reactive to light  Neck:      Thyroid: No thyromegaly  Vascular: No carotid bruit or JVD  Cardiovascular:      Rate and Rhythm: Regular rhythm  Heart sounds: S1 normal and S2 normal  No murmur  No friction rub  No gallop   No S3 or S4 sounds  Pulmonary:      Effort: Pulmonary effort is normal       Breath sounds: Normal breath sounds  No wheezing, rhonchi or rales  Abdominal:      General: Bowel sounds are normal  There is no distension  Palpations: Abdomen is soft  Tenderness: There is no abdominal tenderness  Musculoskeletal:      Comments: R shoulder FROM  Pain with int>ext rotation  +compression test   Negative apprehension  +pain to palpation of biceps tendon   Lymphadenopathy:      Cervical: No cervical adenopathy  Neurological:      Mental Status: She is alert and oriented to person, place, and time  Cranial Nerves: No cranial nerve deficit  Sensory: No sensory deficit  Deep Tendon Reflexes: Reflexes are normal and symmetric

## 2021-05-14 NOTE — PATIENT INSTRUCTIONS
Naproxen twice daily x 1 week, then as needed  If you still have pain after a week, then Dr Susanna Javed or Dr Jada Allen at [facility]  Trazodone 50mg nightly and let me know how that works  Refer to Dr Eliud Aranda at ENT

## 2021-06-23 ENCOUNTER — APPOINTMENT (OUTPATIENT)
Dept: LAB | Facility: MEDICAL CENTER | Age: 76
End: 2021-06-23
Payer: MEDICARE

## 2021-06-23 DIAGNOSIS — H90.42 SENSORINEURAL HEARING LOSS (SNHL) OF LEFT EAR WITH UNRESTRICTED HEARING OF RIGHT EAR: ICD-10-CM

## 2021-06-23 DIAGNOSIS — H93.13 TINNITUS OF BOTH EARS: Primary | ICD-10-CM

## 2021-06-23 LAB — BUN SERPL-MCNC: 15 MG/DL (ref 5–25)

## 2021-06-23 PROCEDURE — 36415 COLL VENOUS BLD VENIPUNCTURE: CPT

## 2021-06-23 PROCEDURE — 84520 ASSAY OF UREA NITROGEN: CPT

## 2021-06-23 PROCEDURE — 86618 LYME DISEASE ANTIBODY: CPT

## 2021-06-23 PROCEDURE — 82565 ASSAY OF CREATININE: CPT

## 2021-06-24 LAB — B BURGDOR IGG+IGM SER-ACNC: 13

## 2021-06-28 LAB
CREAT SERPL-MCNC: 0.86 MG/DL (ref 0.6–1.3)
GFR SERPL CREATININE-BSD FRML MDRD: 67 ML/MIN/1.73SQ M

## 2021-06-29 ENCOUNTER — HOSPITAL ENCOUNTER (OUTPATIENT)
Dept: MRI IMAGING | Facility: HOSPITAL | Age: 76
Discharge: HOME | End: 2021-06-29
Payer: MEDICARE

## 2021-06-29 DIAGNOSIS — H93.13 TINNITUS OF BOTH EARS: ICD-10-CM

## 2021-06-29 DIAGNOSIS — H90.42 SENSORINEURAL HEARING LOSS (SNHL) OF LEFT EAR WITH UNRESTRICTED HEARING OF RIGHT EAR: ICD-10-CM

## 2021-06-29 PROCEDURE — G1004 CDSM NDSC: HCPCS

## 2021-06-29 PROCEDURE — A9585 GADOBUTROL INJECTION: HCPCS | Performed by: PHYSICIAN ASSISTANT

## 2021-06-29 PROCEDURE — 70553 MRI BRAIN STEM W/O & W/DYE: CPT

## 2021-06-29 RX ADMIN — GADOBUTROL 5 ML: 604.72 INJECTION INTRAVENOUS at 08:14

## 2021-08-31 ENCOUNTER — OFFICE VISIT (OUTPATIENT)
Dept: FAMILY MEDICINE CLINIC | Facility: CLINIC | Age: 76
End: 2021-08-31
Payer: MEDICARE

## 2021-08-31 VITALS
HEIGHT: 63 IN | BODY MASS INDEX: 21.44 KG/M2 | TEMPERATURE: 97.7 F | DIASTOLIC BLOOD PRESSURE: 68 MMHG | WEIGHT: 121 LBS | HEART RATE: 76 BPM | SYSTOLIC BLOOD PRESSURE: 132 MMHG | RESPIRATION RATE: 16 BRPM | OXYGEN SATURATION: 98 %

## 2021-08-31 DIAGNOSIS — Z85.850 HISTORY OF THYROID CANCER: ICD-10-CM

## 2021-08-31 DIAGNOSIS — R79.89 ABNORMAL TSH: ICD-10-CM

## 2021-08-31 DIAGNOSIS — R73.01 IMPAIRED FASTING GLUCOSE: ICD-10-CM

## 2021-08-31 DIAGNOSIS — Z00.00 MEDICARE ANNUAL WELLNESS VISIT, SUBSEQUENT: Primary | ICD-10-CM

## 2021-08-31 PROCEDURE — G0439 PPPS, SUBSEQ VISIT: HCPCS | Performed by: FAMILY MEDICINE

## 2021-08-31 PROCEDURE — 1123F ACP DISCUSS/DSCN MKR DOCD: CPT | Performed by: FAMILY MEDICINE

## 2021-08-31 NOTE — PROGRESS NOTES
Assessment and Plan:     Problem List Items Addressed This Visit        Endocrine    Impaired fasting glucose    Relevant Orders    Comprehensive metabolic panel    Hemoglobin A1C      Other Visit Diagnoses     Medicare annual wellness visit, subsequent    -  Primary    up to date preventively  continue helathy diet and exercise  flu shot in october      Relevant Orders    CBC and differential    Lipid panel    Abnormal TSH        History of thyroid cancer        Relevant Orders    TSH, 3rd generation           Preventive health issues were discussed with patient, and age appropriate screening tests were ordered as noted in patient's After Visit Summary  Personalized health advice and appropriate referrals for health education or preventive services given if needed, as noted in patient's After Visit Summary       History of Present Illness:     Patient presents for Medicare Annual Wellness visit    Patient Care Team:  Anita Mayfield DO as PCP - General (Family Medicine)     Problem List:     Patient Active Problem List   Diagnosis    Antithyroid agents causing adverse effect in therapeutic use    Cardiac arrhythmia    Family history of malignant neoplasm of breast    Impaired fasting glucose    Malignant neoplasm of thyroid gland ([address] )    Mitral valve disorder    Osteoporosis    Papillary carcinoma of thyroid ([address] )    Pericardial effusion    Basal cell carcinoma (BCC) of supratip of nose      Past Medical and Surgical History:     Past Medical History:   Diagnosis Date    Anesthesia     "after one foot surgery which was later in the day took longer to wake up "    Basal cell carcinoma of nose 10/2019    Cancer ([address] ) 1990    Thyroid cancer    Carcinoma ([address] ) 1980    basil cell left ear 1990 and head 2013    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    Colon polyp     Dental crowns present     one dental implant upper right    Exercises daily     Foot pain, left     OR correction today 3/13/2020    History of mitral valve prolapse     "no problems"    Hypothyroidism     post surgical    IFG (impaired fasting glucose)     s/p thyroidectomy and PALOMO    Osteoporosis     Pericardial effusion     "sees cardiologist regularly, being monitored and no symptoms found incidentally on an echo"    Thyroid ca ([address] )     Tinnitus     Varicella     Wears glasses     Wears glasses      Past Surgical History:   Procedure Laterality Date    ABDOMINAL SURGERY  01/01/1980    adhesion removal      APPENDECTOMY      BRANCHIAL CLEFT CYST EXCISION  1980    CATARACT EXTRACTION Bilateral     COLECTOMY Right 03/2019    right ellis    COLON SURGERY  1980    for adhesions    COLONOSCOPY      FOOT NEUROMA SURGERY Right     R foot 2nd toes      FOOT SURGERY Left     FOOT TENDON SURGERY Left 3/13/2020    Procedure: FDL TENDON TRANSFER;  Surgeon: Edison Murrell DPM;  [facility];  Service: Podiatry    MOHS RECONSTRUCTION N/A 10/2/2019    Procedure: NOSE MOHS RECONSTRUCTION;  Surgeon: Heather Lombardo MD;  [facility];  Service: Plastics    SKIN CANCER EXCISION      [address]    THYROIDECTOMY      thyroid cancer      TOE OSTEOTOMY Left 3/13/2020    Procedure: 2ND MTPJ EXPLOR   W/ REMOVAL RETAINED HARDWARE; 2ND MET OSTEOTOMY, FUSION 2ND TOE, 1ST TOE AND MET OSTEOTOMY; FLEXOR RELEASE 3RD AND 4TH TOES;  Surgeon: Edison Murrell DPM;  [facility];  Service: Podiatry    TONSILLECTOMY AND ADENOIDECTOMY      TUBAL LIGATION      VAGINAL HYSTERECTOMY        Family History:     Family History   Problem Relation Age of Onset    Heart disease Mother     Diabetes type II Father     Heart disease Father     Diabetes Father     Thyroid cancer Sister     Osteoarthritis Sister     Thyroid cancer Brother     Diabetes type II Brother     Diabetes Brother     No Known Problems Son     No Known Problems Daughter       Social History:     Social History     Socioeconomic History    Marital status: /Civil Union     Spouse name: Ermelinda Gould Number of children: 2    Years of education: None    Highest education level: None   Occupational History    Occupation: Retired    Tobacco Use    Smoking status: Former Smoker     Packs/day: 1 50     Years: 12 00     Pack years: 18 00     Types: Cigarettes     Start date: 1962     Quit date: 1970     Years since quittin 6    Smokeless tobacco: Never Used    Tobacco comment: 1-2 packs a day    Vaping Use    Vaping Use: Never used   Substance and Sexual Activity    Alcohol use: Yes     Alcohol/week: 4 0 - 5 0 standard drinks     Types: 4 - 5 Glasses of wine per week     Comment: wine    Drug use: Never    Sexual activity: Yes     Partners: Male     Birth control/protection: Post-menopausal, None   Other Topics Concern    None   Social History Narrative    None     Social Determinants of Health     Financial Resource Strain:     Difficulty of Paying Living Expenses:    Food Insecurity:     Worried About Running Out of Food in the Last Year:     Ran Out of Food in the Last Year:    Transportation Needs:     Lack of Transportation (Medical):      Lack of Transportation (Non-Medical):    Physical Activity:     Days of Exercise per Week:     Minutes of Exercise per Session:    Stress:     Feeling of Stress :    Social Connections:     Frequency of Communication with Friends and Family:     Frequency of Social Gatherings with Friends and Family:     Attends Bahai Services:     Active Member of Clubs or Organizations:     Attends Club or Organization Meetings:     Marital Status:    Intimate Partner Violence:     Fear of Current or Ex-Partner:     Emotionally Abused:     Physically Abused:     Sexually Abused:       Medications and Allergies:     Current Outpatient Medications   Medication Sig Dispense Refill    acetaminophen (TYLENOL) 500 mg tablet Take 500 mg by mouth every 6 (six) hours as needed for mild pain      alendronate (FOSAMAX) 70 mg tablet Take 70 mg by mouth once a week      Calcium (RA CALCIUM) 500 MG tablet Take 500 mg by mouth daily      Cholecalciferol (VITAMIN D) 2000 units tablet Take 2,000 Units by mouth daily      estradiol (ESTRACE) 0 1 mg/g vaginal cream estradiol 0 01% (0 1 mg/gram) vaginal cream   APPLY A SMALL AMOUNT AT THE URETHRAL OPENING 3X A WEEK      levothyroxine (SYNTHROID) 88 mcg tablet Take 88 mcg by mouth daily      Lutein 20 MG TABS Take 20 mg by mouth daily      Multiple Vitamin (MULTI-VITAMIN DAILY) TABS Take 1 tablet by mouth daily       naproxen (NAPROSYN) 500 mg tablet Take 1 tablet (500 mg total) by mouth 2 (two) times a day with meals 60 tablet 0     No current facility-administered medications for this visit       Allergies   Allergen Reactions    Latex      Latex gloves caused peeling of finger tips    Phenobarbital GI Intolerance     May be per pt /years and years ago        Immunizations:     Immunization History   Administered Date(s) Administered    H1N1, All Formulations 01/12/2010    Hep A, adult 10/10/2011, 04/23/2012    INFLUENZA 12/05/2001, 10/23/2003, 11/08/2006, 10/24/2007, 10/20/2008, 10/13/2009, 10/06/2010, 10/13/2011, 10/09/2012, 10/17/2013, 10/16/2014, 10/19/2015, 10/07/2016, 10/02/2017, 09/11/2018, 10/14/2019, 10/05/2020    Influenza Split High Dose Preservative Free IM 10/19/2015, 10/07/2016, 10/02/2017, 09/11/2018    Pneumococcal Conjugate 13-Valent 05/19/2015    Pneumococcal Polysaccharide PPV23 02/28/2012    SARS-CoV-2 / COVID-19 mRNA IM (Moderna) 01/28/2021, 03/05/2021    Td (adult), Unspecified 10/24/2007    Tdap 10/24/2017    Zoster 03/12/2009, 07/23/2018, 11/13/2018    Zoster Vaccine Recombinant 07/23/2018, 11/13/2018    influenza, trivalent, adjuvanted 10/14/2019      Health Maintenance:         Topic Date Due    Breast Cancer Screening: Mammogram  07/01/2020    DXA SCAN  12/18/2021    Colorectal Cancer Screening  12/05/2022    Hepatitis C Screening Completed         Topic Date Due    Influenza Vaccine (1) 09/01/2021      Medicare Health Risk Assessment:     /68   Pulse 76   Temp 97 7 °F (36 5 °C)   Resp 16   Ht 5' 3" (1 6 m)   Wt 54 9 kg (121 lb)   SpO2 98%   BMI 21 43 kg/m²      Mayra is here for her Subsequent Wellness visit  Health Risk Assessment:   Patient rates overall health as excellent  Patient feels that their physical health rating is same  Patient is very satisfied with their life  Eyesight was rated as same  Hearing was rated as slightly worse  Patient feels that their emotional and mental health rating is same  Patients states they are never, rarely angry  Patient states they are never, rarely unusually tired/fatigued  Pain experienced in the last 7 days has been none  Patient states that she has experienced no weight loss or gain in last 6 months  Depression Screening:   PHQ-2 Score: 0      Fall Risk Screening: In the past year, patient has experienced: no history of falling in past year      Urinary Incontinence Screening:   Patient has not leaked urine accidently in the last six months  Home Safety:  Patient does not have trouble with stairs inside or outside of their home  Patient has working smoke alarms and has working carbon monoxide detector  Home safety hazards include: none  Nutrition:   Current diet is Regular, Low Saturated Fat and Limited junk food  Medications:   Patient is currently taking over-the-counter supplements  OTC medications include: Multivitamin, calcium citrate, Vit D3, Occuvite  Patient is able to manage medications  Activities of Daily Living (ADLs)/Instrumental Activities of Daily Living (IADLs):   Walk and transfer into and out of bed and chair?: Yes  Dress and groom yourself?: Yes    Bathe or shower yourself?: Yes    Feed yourself?  Yes  Do your laundry/housekeeping?: Yes  Manage your money, pay your bills and track your expenses?: Yes  Make your own meals?: Yes    Do your own shopping?: Yes    Previous Hospitalizations:   Any hospitalizations or ED visits within the last 12 months?: No      Advance Care Planning:   Living will: Yes    Durable POA for healthcare: Yes    Advanced directive: Yes      Cognitive Screening:   Provider or family/friend/caregiver concerned regarding cognition?: No    PREVENTIVE SCREENINGS      Cardiovascular Screening:    General: Screening Current      Diabetes Screening:     General: Risks and Benefits Discussed    Due for: Blood Glucose      Colorectal Cancer Screening:     General: Screening Current      Breast Cancer Screening:     General: Screening Current      Cervical Cancer Screening:    General: Screening Not Indicated      Osteoporosis Screening:    General: Screening Not Indicated and History Osteoporosis      Abdominal Aortic Aneurysm (AAA) Screening:        General: Screening Not Indicated      Lung Cancer Screening:     General: Screening Not Indicated      Hepatitis C Screening:    General: Screening Current    Screening, Brief Intervention, and Referral to Treatment (SBIRT)    Screening  Typical number of drinks in a day: 1  Typical number of drinks in a week: 5  Interpretation: Low risk drinking behavior  AUDIT-C Screenin) How often did you have a drink containing alcohol in the past year? 4 or more times a week  2) How many drinks did you have on a typical day when you were drinking in the past year? 1 to 2  3) How often did you have 6 or more drinks on one occasion in the past year? never    AUDIT-C Score: 4  Interpretation: Score 3-12 (female): POSITIVE screen for alcohol misuse    AUDIT Screenin) How often during the last year have you found that you were not able to stop drinking once you had started?  0 - never  5) How often during the last year have you failed to do what was normally expected from you because of drinking? 0 - never  6) How often during the last year have you needed a first drink in the morning to get yourself going after a heavy drinking session? 0 - never  7) How often during the last year have you had a feeling of guilt or remorse after drinking? 0 - never  8) How often during the last year have you been unable to remember what happened the night before because you had been drinking? 0 - never  9) Have you or someone else been injured as a result of your drinking? 0 - no  10) Has a relative or friend or a doctor or another health worker been concerned about your drinking or suggested you cut down? 0 - no    AUDIT Score: 4  Interpretation: Low risk alcohol consumption    Single Item Drug Screening:  How often have you used an illegal drug (including marijuana) or a prescription medication for non-medical reasons in the past year? never    Single Item Drug Screen Score: 0  Interpretation: Negative screen for possible drug use disorder      Brea Wilson DO      Encounter provider Brea Wilson DO    Provider located at [address] [place] [id number] [phone]      Recent Visits  No visits were found meeting these conditions  Showing recent visits within past 7 days and meeting all other requirements  Today's Visits  Date Type Provider Dept   08/31/21 Office Visit Brea Wilson DO [facility] today's visits and meeting all other requirements  Future Appointments  No visits were found meeting these conditions    Showing future appointments within next 150 days and meeting all other requirements

## 2021-08-31 NOTE — PATIENT INSTRUCTIONS
Obtain fasting labs      Medicare Preventive Visit Patient Instructions  Thank you for completing your Welcome to Medicare Visit or Medicare Annual Wellness Visit today  Your next wellness visit will be due in one year (9/1/2022)  The screening/preventive services that you may require over the next 5-10 years are detailed below  Some tests may not apply to you based off risk factors and/or age  Screening tests ordered at today's visit but not completed yet may show as past due  Also, please note that scanned in results may not display below  Preventive Screenings:  Service Recommendations Previous Testing/Comments   Colorectal Cancer Screening  * Colonoscopy    * Fecal Occult Blood Test (FOBT)/Fecal Immunochemical Test (FIT)  * Fecal DNA/Cologuard Test  * Flexible Sigmoidoscopy Age: 54-65 years old   Colonoscopy: every 10 years (may be performed more frequently if at higher risk)  OR  FOBT/FIT: every 1 year  OR  Cologuard: every 3 years  OR  Sigmoidoscopy: every 5 years  Screening may be recommended earlier than age 48 if at higher risk for colorectal cancer  Also, an individualized decision between you and your healthcare provider will decide whether screening between the ages of 74-80 would be appropriate  Colonoscopy: 12/05/2017  FOBT/FIT: Not on file  Cologuard: Not on file  Sigmoidoscopy: Not on file    Screening Current     Breast Cancer Screening Age: 36 years old  Frequency: every 1-2 years  Not required if history of left and right mastectomy Mammogram: 07/01/2019        Cervical Cancer Screening Between the ages of 21-29, pap smear recommended once every 3 years  Between the ages of 33-67, can perform pap smear with HPV co-testing every 5 years     Recommendations may differ for women with a history of total hysterectomy, cervical cancer, or abnormal pap smears in past  Pap Smear: Not on file    Screening Not Indicated   Hepatitis C Screening Once for adults born between 1945 and 1965  More frequently in patients at high risk for Hepatitis C Hep C Antibody: 06/09/2017    Screening Current   Diabetes Screening 1-2 times per year if you're at risk for diabetes or have pre-diabetes Fasting glucose: No results in last 5 years   A1C: 6 1        Cholesterol Screening Once every 5 years if you don't have a lipid disorder  May order more often based on risk factors  Lipid panel: 08/01/2018    Screening Current     Other Preventive Screenings Covered by Medicare:  1  Abdominal Aortic Aneurysm (AAA) Screening: covered once if your at risk  You're considered to be at risk if you have a family history of AAA  2  Lung Cancer Screening: covers low dose CT scan once per year if you meet all of the following conditions: (1) Age 50-69; (2) No signs or symptoms of lung cancer; (3) Current smoker or have quit smoking within the last 15 years; (4) You have a tobacco smoking history of at least 30 pack years (packs per day multiplied by number of years you smoked); (5) You get a written order from a healthcare provider  3  Glaucoma Screening: covered annually if you're considered high risk: (1) You have diabetes OR (2) Family history of glaucoma OR (3)  aged 48 and older OR (3)  American aged 72 and older  3  Osteoporosis Screening: covered every 2 years if you meet one of the following conditions: (1) You're estrogen deficient and at risk for osteoporosis based off medical history and other findings; (2) Have a vertebral abnormality; (3) On glucocorticoid therapy for more than 3 months; (4) Have primary hyperparathyroidism; (5) On osteoporosis medications and need to assess response to drug therapy  · Last bone density test (DXA Scan): 12/18/2020   5  HIV Screening: covered annually if you're between the age of 15-65  Also covered annually if you are younger than 13 and older than 72 with risk factors for HIV infection   For pregnant patients, it is covered up to 3 times per pregnancy  Immunizations:  Immunization Recommendations   Influenza Vaccine Annual influenza vaccination during flu season is recommended for all persons aged >= 6 months who do not have contraindications   Pneumococcal Vaccine (Prevnar and Pneumovax)  * Prevnar = PCV13  * Pneumovax = PPSV23   Adults 25-60 years old: 1-3 doses may be recommended based on certain risk factors  Adults 72 years old: Prevnar (PCV13) vaccine recommended followed by Pneumovax (PPSV23) vaccine  If already received PPSV23 since turning 65, then PCV13 recommended at least one year after PPSV23 dose  Hepatitis B Vaccine 3 dose series if at intermediate or high risk (ex: diabetes, end stage renal disease, liver disease)   Tetanus (Td) Vaccine - COST NOT COVERED BY MEDICARE PART B Following completion of primary series, a booster dose should be given every 10 years to maintain immunity against tetanus  Td may also be given as tetanus wound prophylaxis  Tdap Vaccine - COST NOT COVERED BY MEDICARE PART B Recommended at least once for all adults  For pregnant patients, recommended with each pregnancy  Shingles Vaccine (Shingrix) - COST NOT COVERED BY MEDICARE PART B  2 shot series recommended in those aged 48 and above     Health Maintenance Due:      Topic Date Due    Breast Cancer Screening: Mammogram  07/01/2020    DXA SCAN  12/18/2021    Colorectal Cancer Screening  12/05/2022    Hepatitis C Screening  Completed     Immunizations Due:      Topic Date Due    Influenza Vaccine (1) 09/01/2021     Advance Directives   What are advance directives? Advance directives are legal documents that state your wishes and plans for medical care  These plans are made ahead of time in case you lose your ability to make decisions for yourself  Advance directives can apply to any medical decision, such as the treatments you want, and if you want to donate organs  What are the types of advance directives?   There are many types of advance directives, and each state has rules about how to use them  You may choose a combination of any of the following:  · Living will: This is a written record of the treatment you want  You can also choose which treatments you do not want, which to limit, and which to stop at a certain time  This includes surgery, medicine, IV fluid, and tube feedings  · Durable power of  for healthcare [facility]): This is a written record that states who you want to make healthcare choices for you when you are unable to make them for yourself  This person, called a proxy, is usually a family member or a friend  You may choose more than 1 proxy  · Do not resuscitate (DNR) order:  A DNR order is used in case your heart stops beating or you stop breathing  It is a request not to have certain forms of treatment, such as CPR  A DNR order may be included in other types of advance directives  · Medical directive: This covers the care that you want if you are in a coma, near death, or unable to make decisions for yourself  You can list the treatments you want for each condition  Treatment may include pain medicine, surgery, blood transfusions, dialysis, IV or tube feedings, and a ventilator (breathing machine)  · Values history: This document has questions about your views, beliefs, and how you feel and think about life  This information can help others choose the care that you would choose  Why are advance directives important? An advance directive helps you control your care  Although spoken wishes may be used, it is better to have your wishes written down  Spoken wishes can be misunderstood, or not followed  Treatments may be given even if you do not want them  An advance directive may make it easier for your family to make difficult choices about your care  Alcohol Use and Your Health    Drinking too much can harm your health    Excessive alcohol use leads to about 88,000 death in the [place] each year, and shortens the life of those who diet by almost 30 years  Further, excessive drinking cost the economy $249 billion in 2010  Most excessive drinkers are not alcohol dependent  Excessive alcohol use has immediate effects that increase the risk of many harmful health conditions  These are most often the result of binge drinking  Over time, excessive alcohol use can lead to the development of chronic diseases and other series health problems  What is considered a "drink"? Excessive alcohol use includes:  · Binge Drinking: For women, 4 or more drinks consumed on one occasion  For men, 5 or more drinks consumed on one occasion  · Heavy Drinking: For women, 8 or more drinks per week  For men, 15 or more drinks per week  · Any alcohol used by pregnant women  · Any alcohol used by those under the age of 21 years    If you choose to drink, do so in moderation:  · Do not drink at all if you are under the age of 24, or if you are or may be pregnant, or have health problems that could be made worse by drinking    · For women, up to 1 drink per day  · For men, up to 2 drinks a day    No one should begin drinking or drink more frequently based on potential health benefits    Short-Term Health Risks:  · Injuries: motor vehicle crashes, falls, drownings, burns  · Violence: homicide, suicide, sexual assault, intimate partner violence  · Alcohol poisoning  · Reproductive health: risky sexual behaviors, unintended prengnacy, sexually transmitted diseases, miscarriage, stillbirth, fetal alcohol syndrome    Long-Term Health Risks:  · Chronic diseases: high blood pressure, heart disease, stroke, liver disease, digestive problems  · Cancers: breast, mouth and throat, liver, colon  · Learning and memory problems: dementia, poor school performance  · Mental health: depression, anxiety, insomnia  · Social problems: lost productivity, family problems, unemployment  · Alcohol dependence    For support and more information:  · Substance Abuse and [address] [address]  Web Address: https://Moasis/    · Alcoholics Anonymous        Web Address: http://www stern info/    https://www cdc gov/alcohol/fact-sheets/alcohol-use htm     © [employer] [facility] 2018 Information is for End User's use only and may not be sold, redistributed or otherwise used for commercial purposes   All illustrations and images included in CareNotes® are the copyrighted property of A D A M [employer]  or [address] [place] [address]

## 2021-09-17 ENCOUNTER — CONSULT (OUTPATIENT)
Dept: NEUROSURGERY | Facility: CLINIC | Age: 76
End: 2021-09-17
Payer: MEDICARE

## 2021-09-17 VITALS
TEMPERATURE: 98.4 F | DIASTOLIC BLOOD PRESSURE: 72 MMHG | WEIGHT: 123 LBS | SYSTOLIC BLOOD PRESSURE: 132 MMHG | HEART RATE: 78 BPM | HEIGHT: 63 IN | BODY MASS INDEX: 21.79 KG/M2 | RESPIRATION RATE: 16 BRPM

## 2021-09-17 DIAGNOSIS — R90.89 ABNORMAL FINDING ON MRI OF BRAIN: ICD-10-CM

## 2021-09-17 DIAGNOSIS — D18.00 CAVERNOUS ANGIOMA: Primary | ICD-10-CM

## 2021-09-17 PROCEDURE — 99204 OFFICE O/P NEW MOD 45 MIN: CPT | Performed by: RADIOLOGY

## 2021-09-17 NOTE — PROGRESS NOTES
Assessment/Plan:     Diagnoses and all orders for this visit:    Cavernous angioma  -     Ambulatory referral to Neurosurgery  -     MRI brain w wo contrast; Future    Abnormal finding on MRI of brain  -     Ambulatory referral to Neurosurgery        Discussion Summary:   Sofia Paniagua has an incidental cavernoma with associated DVA  She is asymptomatic from this  There is evidence of recent rupture in the past several months therefore we will obtain a 6 month fu MRI for stability  Chief Complaint: Consult      Patient ID: Manny Cedeño is a 78 y o  female    HPI   Ms  Dot presents for evaluation of an incidental brain lesion  She reports tinnitus for past year  It is a constant high-pitched sound in both for years  Non pulsatile, non-whooshing  Workup of her symptoms included a brain MRI which revealed a chronic left parietal hemorrhage concerning for cavernoma  Denies any headaches  No stroke-like symptoms  No seizure-like activity  Patient is  with 2 children ages 40 and 55  She is a retired a public health nurse and cancer   Quit smoking in the 1970's, social alcohol, and denies any drug usage  Denies any family history of brain aneurysm or sudden death  Father had a benign brain tumor  Review of Systems   HENT: Positive for tinnitus  Eyes: Negative  Respiratory: Negative  Cardiovascular: Negative  Gastrointestinal: Negative  Endocrine: Negative  Genitourinary: Negative  Musculoskeletal: Positive for back pain (pulled a muscle last night)  Negative for gait problem, myalgias and neck pain  Skin: Negative  Allergic/Immunologic: Negative  Neurological: Negative  Negative for syncope  Hematological: Negative  Psychiatric/Behavioral: Negative  I have personally reviewed the MA's review of systems and made changes as necessary      The following portions of the patient's history were reviewed and updated as appropriate: allergies, current medications, past family history, past medical history, past social history, past surgical history and problem list       Active Ambulatory Problems     Diagnosis Date Noted    Antithyroid agents causing adverse effect in therapeutic use 12/19/2013    Cardiac arrhythmia 03/28/2014    Family history of malignant neoplasm of breast 05/14/2014    Impaired fasting glucose 05/19/2015    Malignant ne[facility] gland [facility] 07/12/2013    Mitral valve disorder 02/11/2015    Osteoporosis 01/25/2011    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    Pericardial effusion 01/24/2018    Basal cell carcinoma (BCC) of supratip of nose 10/02/2019     Resolved Ambulatory Problems     Diagnosis Date Noted    No Resolved Ambulatory Problems     Past Medical History:   Diagnosis Date    Anesthesia     Basal cell carcinoma of nose 10/2019    Cancer ([facility] ) 1990    Carcinoma ([facility] ) 1980    [facility] 03/16/2019    Colon polyp     Dental crowns present     Exercises daily     Foot pain, left     History of mitral valve prolapse     Hypothyroidism     IFG (impaired fasting glucose)     Thyroid ca [facility]     Tinnitus     Varicella     Wears glasses     Wears glasses        Past Surgical History:   Procedure Laterality Date    ABDOMINAL SURGERY  01/01/1980    adhesion removal      APPENDECTOMY      BRANCHIAL CLEFT CYST EXCISION  1980    CATARACT EXTRACTION Bilateral     COLECTOMY Right 03/2019    right ellis    COLON SURGERY  1980    for adhesions    COLONOSCOPY      FOOT NEUROMA SURGERY Right     R foot 2nd toes      FOOT SURGERY Left     FOOT TENDON SURGERY Left 3/13/2020    Procedure: FDL TENDON TRANSFER;  Surgeon: Aparna Altamirano DPM;  [facility];  Service: Podiatry    MOHS RECONSTRUCTION N/A 10/2/2019    Procedure: NOSE MOHS RECONSTRUCTION;  Surgeon: Rolo Calle MD;  [facility];  Service: Plastics    SKIN CANCER EXCISION      Méndez Carola thyroid cancer      TOE OSTEOTOMY Left 3/13/2020    Procedure: 2ND MTPJ EXPLOR  W/ REMOVAL RETAINED HARDWARE; 2ND MET OSTEOTOMY, FUSION 2ND TOE, 1ST TOE AND MET OSTEOTOMY; FLEXOR RELEASE 3RD AND 4TH TOES;  Surgeon: Juan Mcdonnell DPM;  [facility];  Service: Podiatry    TONSILLECTOMY AND ADENOIDECTOMY      TUBAL LIGATION      VAGINAL HYSTERECTOMY         Current Outpatient Medications   Medication Sig Dispense Refill    acetaminophen (TYLENOL) 500 mg tablet Take 500 mg by mouth every 6 (six) hours as needed for mild pain      alendronate (FOSAMAX) 70 mg tablet Take 70 mg by mouth once a week      Calcium (RA CALCIUM) 500 MG tablet Take 500 mg by mouth daily      Cholecalciferol (VITAMIN D) 2000 units tablet Take 2,000 Units by mouth daily      estradiol (ESTRACE) 0 1 mg/g vaginal cream estradiol 0 01% (0 1 mg/gram) vaginal cream   APPLY A SMALL AMOUNT AT THE URETHRAL OPENING 3X A WEEK      levothyroxine (SYNTHROID) 88 mcg tablet Take 88 mcg by mouth daily      Lutein 20 MG TABS Take 20 mg by mouth daily      Multiple Vitamin (MULTI-VITAMIN DAILY) TABS Take 1 tablet by mouth daily       naproxen (NAPROSYN) 500 mg tablet Take 1 tablet (500 mg total) by mouth 2 (two) times a day with meals (Patient taking differently: Take 500 mg by mouth 2 (two) times a day as needed ) 60 tablet 0     No current facility-administered medications for this visit  Vitals:    09/17/21 1418   BP: 132/72   Pulse: 78   Resp: 16   Temp: 98 4 °F (36 9 °C)         Objective:    Physical Exam  Neurologic Exam    Results/Data:  I have reviewed the results and images from the MRI in detail with the patient

## 2021-10-06 ENCOUNTER — PATIENT MESSAGE (OUTPATIENT)
Dept: FAMILY MEDICINE CLINIC | Facility: CLINIC | Age: 76
End: 2021-10-06

## 2021-12-06 ENCOUNTER — OFFICE VISIT (OUTPATIENT)
Dept: FAMILY MEDICINE CLINIC | Facility: CLINIC | Age: 76
End: 2021-12-06
Payer: COMMERCIAL

## 2021-12-06 ENCOUNTER — TELEPHONE (OUTPATIENT)
Dept: FAMILY MEDICINE CLINIC | Facility: CLINIC | Age: 76
End: 2021-12-06

## 2021-12-06 VITALS
BODY MASS INDEX: 22.04 KG/M2 | SYSTOLIC BLOOD PRESSURE: 148 MMHG | DIASTOLIC BLOOD PRESSURE: 82 MMHG | RESPIRATION RATE: 16 BRPM | HEART RATE: 97 BPM | TEMPERATURE: 97.8 F | OXYGEN SATURATION: 98 % | HEIGHT: 63 IN | WEIGHT: 124.4 LBS
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
## 2021-12-06 DIAGNOSIS — M54.6 ACUTE THORACIC BACK PAIN, UNSPECIFIED BACK PAIN LATERALITY: ICD-10-CM

## 2021-12-06 DIAGNOSIS — M25.571 ACUTE RIGHT ANKLE PAIN: ICD-10-CM

## 2021-12-06 DIAGNOSIS — R51.9 NONINTRACTABLE HEADACHE, UNSPECIFIED CHRONICITY PATTERN, UNSPECIFIED HEADACHE TYPE: ICD-10-CM

## 2021-12-06 DIAGNOSIS — S20.219A CONTUSION OF CHEST WALL, UNSPECIFIED LATERALITY, INITIAL ENCOUNTER: ICD-10-CM

## 2021-12-06 DIAGNOSIS — M25.561 ACUTE PAIN OF RIGHT KNEE: ICD-10-CM

## 2021-12-06 DIAGNOSIS — M25.512 ACUTE PAIN OF LEFT SHOULDER: ICD-10-CM

## 2021-12-06 DIAGNOSIS — M54.2 NECK PAIN: Primary | ICD-10-CM

## 2021-12-06 DIAGNOSIS — R07.89 OTHER CHEST PAIN: ICD-10-CM

## 2021-12-06 PROCEDURE — 99214 OFFICE O/P EST MOD 30 MIN: CPT | Performed by: FAMILY MEDICINE

## 2021-12-06 RX ORDER — METHOCARBAMOL 500 MG/1
TABLET, FILM COATED ORAL
COMMUNITY
Start: 2021-12-04

## 2021-12-06 RX ORDER — LIDOCAINE 50 MG/G
PATCH TOPICAL
COMMUNITY
Start: 2021-12-04

## 2021-12-20 ENCOUNTER — EVALUATION (OUTPATIENT)
Dept: PHYSICAL THERAPY | Facility: MEDICAL CENTER | Age: 76
End: 2021-12-20
Payer: COMMERCIAL

## 2021-12-20 DIAGNOSIS — M54.2 NECK PAIN: ICD-10-CM

## 2021-12-20 DIAGNOSIS — M25.512 ACUTE PAIN OF LEFT SHOULDER: ICD-10-CM

## 2021-12-20 DIAGNOSIS — S20.219A CONTUSION OF CHEST WALL, UNSPECIFIED LATERALITY, INITIAL ENCOUNTER: ICD-10-CM

## 2021-12-20 DIAGNOSIS — M25.561 ACUTE PAIN OF RIGHT KNEE: ICD-10-CM

## 2021-12-20 DIAGNOSIS — M54.6 ACUTE THORACIC BACK PAIN, UNSPECIFIED BACK PAIN LATERALITY: ICD-10-CM
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
## 2021-12-20 DIAGNOSIS — M25.571 ACUTE RIGHT ANKLE PAIN: ICD-10-CM

## 2021-12-20 DIAGNOSIS — R51.9 NONINTRACTABLE HEADACHE, UNSPECIFIED CHRONICITY PATTERN, UNSPECIFIED HEADACHE TYPE: ICD-10-CM

## 2021-12-20 DIAGNOSIS — R07.89 OTHER CHEST PAIN: Primary | ICD-10-CM

## 2021-12-20 PROCEDURE — 97161 PT EVAL LOW COMPLEX 20 MIN: CPT | Performed by: PHYSICAL THERAPIST

## 2022-01-03 ENCOUNTER — OFFICE VISIT (OUTPATIENT)
Dept: PHYSICAL THERAPY | Facility: MEDICAL CENTER | Age: 77
End: 2022-01-03
Payer: COMMERCIAL

## 2022-01-03 DIAGNOSIS — M54.2 NECK PAIN: ICD-10-CM

## 2022-01-03 DIAGNOSIS — M25.571 ACUTE RIGHT ANKLE PAIN: ICD-10-CM

## 2022-01-03 DIAGNOSIS — M25.512 ACUTE PAIN OF LEFT SHOULDER: ICD-10-CM

## 2022-01-03 DIAGNOSIS — S20.219A CONTUSION OF CHEST WALL, UNSPECIFIED LATERALITY, INITIAL ENCOUNTER: ICD-10-CM
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
## 2022-01-03 DIAGNOSIS — R51.9 NONINTRACTABLE HEADACHE, UNSPECIFIED CHRONICITY PATTERN, UNSPECIFIED HEADACHE TYPE: ICD-10-CM

## 2022-01-03 DIAGNOSIS — R07.89 OTHER CHEST PAIN: ICD-10-CM

## 2022-01-03 DIAGNOSIS — M54.6 ACUTE THORACIC BACK PAIN, UNSPECIFIED BACK PAIN LATERALITY: ICD-10-CM

## 2022-01-03 DIAGNOSIS — M25.561 ACUTE PAIN OF RIGHT KNEE: ICD-10-CM

## 2022-01-03 PROCEDURE — 97110 THERAPEUTIC EXERCISES: CPT | Performed by: PHYSICAL THERAPIST

## 2022-01-03 PROCEDURE — 97140 MANUAL THERAPY 1/> REGIONS: CPT | Performed by: PHYSICAL THERAPIST
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
## 2022-01-10 ENCOUNTER — OFFICE VISIT (OUTPATIENT)
Dept: PHYSICAL THERAPY | Facility: MEDICAL CENTER | Age: 77
End: 2022-01-10
Payer: COMMERCIAL

## 2022-01-10 DIAGNOSIS — R51.9 NONINTRACTABLE HEADACHE, UNSPECIFIED CHRONICITY PATTERN, UNSPECIFIED HEADACHE TYPE: ICD-10-CM

## 2022-01-10 DIAGNOSIS — R07.89 OTHER CHEST PAIN: ICD-10-CM

## 2022-01-10 DIAGNOSIS — M25.561 ACUTE PAIN OF RIGHT KNEE: ICD-10-CM
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
## 2022-01-10 DIAGNOSIS — M25.571 ACUTE RIGHT ANKLE PAIN: ICD-10-CM

## 2022-01-10 DIAGNOSIS — M54.2 NECK PAIN: ICD-10-CM

## 2022-01-10 DIAGNOSIS — S20.219A CONTUSION OF CHEST WALL, UNSPECIFIED LATERALITY, INITIAL ENCOUNTER: ICD-10-CM

## 2022-01-10 DIAGNOSIS — M54.6 ACUTE THORACIC BACK PAIN, UNSPECIFIED BACK PAIN LATERALITY: ICD-10-CM

## 2022-01-10 DIAGNOSIS — M25.512 ACUTE PAIN OF LEFT SHOULDER: ICD-10-CM

## 2022-01-10 PROCEDURE — 97140 MANUAL THERAPY 1/> REGIONS: CPT | Performed by: PHYSICAL THERAPIST
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
## 2022-03-15 ENCOUNTER — DOCUMENTATION (OUTPATIENT)
Dept: NEUROSURGERY | Facility: CLINIC | Age: 77
End: 2022-03-15

## 2022-03-15 ENCOUNTER — APPOINTMENT (OUTPATIENT)
Dept: LAB | Facility: HOSPITAL | Age: 77
End: 2022-03-15
Attending: RADIOLOGY
Payer: MEDICARE

## 2022-03-15 DIAGNOSIS — Z01.818 PRE-PROCEDURAL EXAMINATION: ICD-10-CM

## 2022-03-15 LAB
BUN SERPL-MCNC: 17 MG/DL (ref 5–25)
CREAT SERPL-MCNC: 0.98 MG/DL (ref 0.6–1.3)
GFR SERPL CREATININE-BSD FRML MDRD: 56 ML/MIN/1.73SQ M

## 2022-03-15 PROCEDURE — 82565 ASSAY OF CREATININE: CPT

## 2022-03-15 PROCEDURE — 84520 ASSAY OF UREA NITROGEN: CPT

## 2022-03-15 PROCEDURE — 36415 COLL VENOUS BLD VENIPUNCTURE: CPT

## 2022-03-15 NOTE — PROGRESS NOTES
[facility] called pt needs bun/ crea before mri 3/17/2022 - pt was told to go today she will go to [facility] order in chart -ba

## 2022-03-17 ENCOUNTER — HOSPITAL ENCOUNTER (OUTPATIENT)
Dept: MRI IMAGING | Facility: HOSPITAL | Age: 77
Discharge: HOME | End: 2022-03-17
Attending: RADIOLOGY
Payer: MEDICARE

## 2022-03-17 DIAGNOSIS — D18.00 CAVERNOUS ANGIOMA: ICD-10-CM

## 2022-03-17 PROCEDURE — A9585 GADOBUTROL INJECTION: HCPCS | Performed by: RADIOLOGY

## 2022-03-17 PROCEDURE — 70553 MRI BRAIN STEM W/O & W/DYE: CPT

## 2022-03-17 PROCEDURE — G1004 CDSM NDSC: HCPCS

## 2022-03-17 RX ADMIN — GADOBUTROL 5 ML: 604.72 INJECTION INTRAVENOUS at 09:20

## 2022-03-25 ENCOUNTER — OFFICE VISIT (OUTPATIENT)
Dept: NEUROSURGERY | Facility: CLINIC | Age: 77
End: 2022-03-25
Payer: MEDICARE

## 2022-03-25 VITALS
SYSTOLIC BLOOD PRESSURE: 118 MMHG | HEART RATE: 72 BPM | DIASTOLIC BLOOD PRESSURE: 80 MMHG | HEIGHT: 63 IN | WEIGHT: 125 LBS | TEMPERATURE: 97.7 F | OXYGEN SATURATION: 99 % | BODY MASS INDEX: 22.15 KG/M2

## 2022-03-25 DIAGNOSIS — G44.309 HEADACHES DUE TO OLD HEAD TRAUMA: Primary | ICD-10-CM

## 2022-03-25 DIAGNOSIS — S09.90XS HEADACHES DUE TO OLD HEAD TRAUMA: Primary | ICD-10-CM

## 2022-03-25 DIAGNOSIS — D18.00 CAVERNOUS ANGIOMA: ICD-10-CM

## 2022-03-25 PROCEDURE — 99214 OFFICE O/P EST MOD 30 MIN: CPT | Performed by: RADIOLOGY
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
## 2022-06-17 ENCOUNTER — TELEPHONE (OUTPATIENT)
Dept: NEUROLOGY | Facility: CLINIC | Age: 77
End: 2022-06-17

## 2022-06-20 NOTE — TELEPHONE ENCOUNTER
Pt called she don't want to receive any calls from Neurology  She claimed she is not rescheduling the appointment   I did close the referral  Thank you

## 2022-08-26 ENCOUNTER — RA CDI HCC (OUTPATIENT)
Dept: OTHER | Facility: HOSPITAL | Age: 77
End: 2022-08-26

## 2022-08-26 NOTE — PROGRESS NOTES
[facility]  coding opportunities       Chart reviewed, no opportunity found: CHART REVIEWED, NO OPPORTUNITY FOUND        Patients Insurance     Medicare Insurance: Medicare

## 2022-09-02 ENCOUNTER — OFFICE VISIT (OUTPATIENT)
Dept: FAMILY MEDICINE CLINIC | Facility: CLINIC | Age: 77
End: 2022-09-02
Payer: MEDICARE

## 2022-09-02 VITALS
OXYGEN SATURATION: 99 % | HEIGHT: 63 IN | DIASTOLIC BLOOD PRESSURE: 86 MMHG | HEART RATE: 77 BPM | TEMPERATURE: 97.1 F | BODY MASS INDEX: 21.72 KG/M2 | WEIGHT: 122.6 LBS | SYSTOLIC BLOOD PRESSURE: 128 MMHG

## 2022-09-02 DIAGNOSIS — R73.01 IMPAIRED FASTING GLUCOSE: ICD-10-CM

## 2022-09-02 DIAGNOSIS — M81.0 AGE-RELATED OSTEOPOROSIS WITHOUT CURRENT PATHOLOGICAL FRACTURE: ICD-10-CM

## 2022-09-02 DIAGNOSIS — Z00.00 MEDICARE ANNUAL WELLNESS VISIT, SUBSEQUENT: Primary | ICD-10-CM

## 2022-09-02 DIAGNOSIS — Z85.850 HISTORY OF THYROID CANCER: ICD-10-CM

## 2022-09-02 PROBLEM — N18.31 STAGE 3A CHRONIC KIDNEY DISEASE (HCC): Status: ACTIVE | Noted: 2022-09-02

## 2022-09-02 PROCEDURE — G0439 PPPS, SUBSEQ VISIT: HCPCS | Performed by: FAMILY MEDICINE

## 2022-09-02 RX ORDER — ASCORBIC ACID 125 MG
TABLET,CHEWABLE ORAL
COMMUNITY

## 2022-09-02 NOTE — PATIENT INSTRUCTIONS
Continue excellent health habits  Obtain fasting labs    Medicare Preventive Visit Patient Instructions  Thank you for completing your Welcome to Medicare Visit or Medicare Annual Wellness Visit today  Your next wellness visit will be due in one year (9/3/2023)  The screening/preventive services that you may require over the next 5-10 years are detailed below  Some tests may not apply to you based off risk factors and/or age  Screening tests ordered at today's visit but not completed yet may show as past due  Also, please note that scanned in results may not display below  Preventive Screenings:  Service Recommendations Previous Testing/Comments   Colorectal Cancer Screening  * Colonoscopy    * Fecal Occult Blood Test (FOBT)/Fecal Immunochemical Test (FIT)  * Fecal DNA/Cologuard Test  * Flexible Sigmoidoscopy Age: 39-70 years old   Colonoscopy: every 10 years (may be performed more frequently if at higher risk)  OR  FOBT/FIT: every 1 year  OR  Cologuard: every 3 years  OR  Sigmoidoscopy: every 5 years  Screening may be recommended earlier than age 39 if at higher risk for colorectal cancer  Also, an individualized decision between you and your healthcare provider will decide whether screening between the ages of 74-80 would be appropriate  Colonoscopy: 12/05/2017  FOBT/FIT: Not on file  Cologuard: Not on file  Sigmoidoscopy: Not on file          Breast Cancer Screening Age: 36 years old  Frequency: every 1-2 years  Not required if history of left and right mastectomy Mammogram: 07/01/2019        Cervical Cancer Screening Between the ages of 21-29, pap smear recommended once every 3 years  Between the ages of 33-67, can perform pap smear with HPV co-testing every 5 years     Recommendations may differ for women with a history of total hysterectomy, cervical cancer, or abnormal pap smears in past  Pap Smear: Not on file        Hepatitis C Screening Once for adults born between 1945 and 1965  More frequently in patients at high risk for Hepatitis C Hep C Antibody: 06/09/2017        Diabetes Screening 1-2 times per year if you're at risk for diabetes or have pre-diabetes Fasting glucose: No results in last 5 years (No results in last 5 years)  A1C: 6 1 (9/27/2021)      Cholesterol Screening Once every 5 years if you don't have a lipid disorder  May order more often based on risk factors  Lipid panel: 08/01/2018          Other Preventive Screenings Covered by Medicare:  Abdominal Aortic Aneurysm (AAA) Screening: covered once if your at risk  You're considered to be at risk if you have a family history of AAA  Lung Cancer Screening: covers low dose CT scan once per year if you meet all of the following conditions: (1) Age 50-69; (2) No signs or symptoms of lung cancer; (3) Current smoker or have quit smoking within the last 15 years; (4) You have a tobacco smoking history of at least 20 pack years (packs per day multiplied by number of years you smoked); (5) You get a written order from a healthcare provider  Glaucoma Screening: covered annually if you're considered high risk: (1) You have diabetes OR (2) Family history of glaucoma OR (3)  aged 48 and older OR (3)  American aged 72 and older  Osteoporosis Screening: covered every 2 years if you meet one of the following conditions: (1) You're estrogen deficient and at risk for osteoporosis based off medical history and other findings; (2) Have a vertebral abnormality; (3) On glucocorticoid therapy for more than 3 months; (4) Have primary hyperparathyroidism; (5) On osteoporosis medications and need to assess response to drug therapy  Last bone density test (DXA Scan): 12/18/2020  HIV Screening: covered annually if you're between the age of 12-76  Also covered annually if you are younger than 13 and older than 72 with risk factors for HIV infection  For pregnant patients, it is covered up to 3 times per pregnancy      Immunizations:  Immunization Recommendations   Influenza Vaccine Annual influenza vaccination during flu season is recommended for all persons aged >= 6 months who do not have contraindications   Pneumococcal Vaccine   * Pneumococcal conjugate vaccine = PCV13 (Prevnar 13), PCV15 (Vaxneuvance), PCV20 (Prevnar 20)  * Pneumococcal polysaccharide vaccine = PPSV23 (Pneumovax) Adults 25-60 years old: 1-3 doses may be recommended based on certain risk factors  Adults 72 years old: 1-2 doses may be recommended based off what pneumonia vaccine you previously received   Hepatitis B Vaccine 3 dose series if at intermediate or high risk (ex: diabetes, end stage renal disease, liver disease)   Tetanus (Td) Vaccine - COST NOT COVERED BY MEDICARE PART B Following completion of primary series, a booster dose should be given every 10 years to maintain immunity against tetanus  Td may also be given as tetanus wound prophylaxis  Tdap Vaccine - COST NOT COVERED BY MEDICARE PART B Recommended at least once for all adults  For pregnant patients, recommended with each pregnancy  Shingles Vaccine (Shingrix) - COST NOT COVERED BY MEDICARE PART B  2 shot series recommended in those aged 48 and above     Health Maintenance Due:      Topic Date Due    DXA SCAN  12/18/2021    Colorectal Cancer Screening  12/05/2022    Breast Cancer Screening: Mammogram  08/05/2023    Hepatitis C Screening  Completed     Immunizations Due:      Topic Date Due    COVID-19 Vaccine (4 - Booster for Moderna series) 03/12/2022    Influenza Vaccine (1) 09/01/2022     Advance Directives   What are advance directives? Advance directives are legal documents that state your wishes and plans for medical care  These plans are made ahead of time in case you lose your ability to make decisions for yourself  Advance directives can apply to any medical decision, such as the treatments you want, and if you want to donate organs  What are the types of advance directives?   There are many types of advance directives, and each state has rules about how to use them  You may choose a combination of any of the following:  Living will: This is a written record of the treatment you want  You can also choose which treatments you do not want, which to limit, and which to stop at a certain time  This includes surgery, medicine, IV fluid, and tube feedings  Durable power of  for healthcare [place] [facility]): This is a written record that states who you want to make healthcare choices for you when you are unable to make them for yourself  This person, called a proxy, is usually a family member or a friend  You may choose more than 1 proxy  Do not resuscitate (DNR) order:  A DNR order is used in case your heart stops beating or you stop breathing  It is a request not to have certain forms of treatment, such as CPR  A DNR order may be included in other types of advance directives  Medical directive: This covers the care that you want if you are in a coma, near death, or unable to make decisions for yourself  You can list the treatments you want for each condition  Treatment may include pain medicine, surgery, blood transfusions, dialysis, IV or tube feedings, and a ventilator (breathing machine)  Values history: This document has questions about your views, beliefs, and how you feel and think about life  This information can help others choose the care that you would choose  Why are advance directives important? An advance directive helps you control your care  Although spoken wishes may be used, it is better to have your wishes written down  Spoken wishes can be misunderstood, or not followed  Treatments may be given even if you do not want them  An advance directive may make it easier for your family to make difficult choices about your care  © Copyright [employer] 2018 Information is for End User's use only and may not be sold, redistributed or otherwise used for commercial purposes   All illustrations and images included in CareNotes® are the copyrighted property of A D A M , Inc  or Laurel Richardson

## 2022-09-02 NOTE — PROGRESS NOTES
Assessment and Plan:     Problem List Items Addressed This Visit        Endocrine    Impaired fasting glucose    Relevant Orders    Hemoglobin A1C       Musculoskeletal and Integument    Osteoporosis  Continue f/u with endo       Other    History of thyroid cancer  Continue f/u with endo      Other Visit Diagnoses     Medicare annual wellness visit, subsequent    -  Primary    doing well  up to date preventively  labs as ordered  continue healthy diet and exercise    Relevant Orders    Comprehensive metabolic panel    Lipid panel          Depression Screening and Follow-up Plan: Patient was screened for depression during today's encounter  They screened negative with a PHQ-2 score of 0  Preventive health issues were discussed with patient, and age appropriate screening tests were ordered as noted in patient's After Visit Summary  Personalized health advice and appropriate referrals for health education or preventive services given if needed, as noted in patient's After Visit Summary  History of Present Illness:     Patient presents for a Medicare Wellness Visit    HPI   Patient Care Team:  Brooks Hatfield DO as PCP - General (Family Medicine)      Pt presents in routine f/u  Feeling well  Golfing multiple times per week, yoga, walking  Due for labs  Up to date on imm's  Will have crc screening this November  Has osteoporosis and follows with dr Alex Augustine  No concerns today  Review of Systems:     Review of Systems   Constitutional: Negative for chills, fatigue, fever and unexpected weight change  HENT: Negative for congestion, ear pain, hearing loss, postnasal drip, rhinorrhea, sinus pressure, sinus pain, sore throat, trouble swallowing and voice change  Eyes: Negative for pain, redness and visual disturbance  Respiratory: Negative for cough and shortness of breath  Cardiovascular: Negative for chest pain, palpitations and leg swelling     Gastrointestinal: Negative for abdominal pain, constipation, diarrhea and nausea  Endocrine: Negative for cold intolerance, heat intolerance, polydipsia and polyuria  Genitourinary: Negative for dysuria, frequency and urgency  Musculoskeletal: Negative for arthralgias, joint swelling and myalgias  Skin: Negative for rash  No suspicious lesions   Neurological: Negative for weakness, numbness and headaches  Hematological: Negative for adenopathy          Problem List:     Patient Active Problem List   Diagnosis    Antithyroid agents causing adverse effect in therapeutic use    Cardiac arrhythmia    Family history of malignant neoplasm of breast    Impaired fasting glucose    Mitral valve disorder    Osteoporosis    Pericardial effusion    Basal cell carcinoma (BCC) of supratip of nose    Cavernous angioma    Stage 3a chronic kidney disease ([facility] [address] )    History of thyroid cancer      Past Medical and Surgical History:     Past Medical History:   Diagnosis Date    Anesthesia     "after one foot surgery which was later in the day took longer to wake up "    Basal cell carcinoma of nose 10/2019    Cancer ([facility] [address] ) 1990    Thyroid cancer    Carcinoma (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 [address] )     Tinnitus     Varicella     Wears glasses     Wears glasses      Past Surgical History:   Procedure Laterality Date    ABDOMINAL SURGERY  01/01/1980    adhesion removal      APPENDECTOMY      BRANCHIAL CLEFT CYST EXCISION  1980    CATARACT EXTRACTION Bilateral     COLECTOMY Right 2019    right ellis    COLON SURGERY      for adhesions    COLONOSCOPY      FOOT NEUROMA SURGERY Right     R foot 2nd toes      FOOT SURGERY Left     FOOT TENDON SURGERY Left 3/13/2020    Procedure: FDL TENDON TRANSFER;  Surgeon: Cristhian Cross DPM;  [facility];  Service: Podiatry    MOHS RECONSTRUCTION N/A 10/2/2019    Procedure: NOSE MOHS RECONSTRUCTION;  Surgeon: Jose J Fulton MD;  [facility];  Service: Plastics    SKIN CANCER EXCISION      BCC    THYROIDECTOMY      thyroid cancer      TOE OSTEOTOMY Left 3/13/2020    Procedure: 2ND MTPJ EXPLOR  W/ REMOVAL RETAINED HARDWARE; 2ND MET OSTEOTOMY, FUSION 2ND TOE, 1ST TOE AND MET OSTEOTOMY; FLEXOR RELEASE 3RD AND 4TH TOES;  Surgeon: Cristhian Cross DPM;  [facility];  Service: Podiatry    TONSILLECTOMY AND ADENOIDECTOMY      TUBAL LIGATION      VAGINAL HYSTERECTOMY        Family History:     Family History   Problem Relation Age of Onset    Heart disease Mother     Diabetes type II Father     Heart disease Father     Diabetes Father     Thyroid cancer Sister     Osteoarthritis Sister     Thyroid cancer Brother     Diabetes type II Brother     Diabetes Brother     No Known Problems Son     No Known Problems Daughter       Social History:     Social History     Socioeconomic History    Marital status: /Civil Union     Spouse name: Layman Sarah Number of children: 2    Years of education: None    Highest education level: None   Occupational History    Occupation: Retired    Tobacco Use    Smoking status: Former Smoker     Packs/day: 1 50     Years: 12 00     Pack years: 18 00     Types: Cigarettes     Start date: 1962     Quit date: 1970     Years since quittin 7    Smokeless tobacco: Never Used    Tobacco comment: 1-2 packs a day    Vaping Use    Vaping Use: Never used   Substance and Sexual Activity    Alcohol use:  Yes Alcohol/week: 4 0 - 5 0 standard drinks     Types: 4 - 5 Glasses of wine per week     Comment: wine    Drug use: Never    Sexual activity: Yes     Partners: Male     Birth control/protection: Post-menopausal, None   Other Topics Concern    None   Social History Narrative    None     Social Determinants of Health     Financial Resource Strain: Not on file   Food Insecurity: Not on file   Transportation Needs: Not on file   Physical Activity: Not on file   Stress: Not on file   Social Connections: Not on file   Intimate Partner Violence: Not on file   Housing Stability: Not on file      Medications and Allergies:     Current Outpatient Medications   Medication Sig Dispense Refill    acetaminophen (TYLENOL) 500 mg tablet Take 500 mg by mouth every 6 (six) hours as needed for mild pain      alendronate (FOSAMAX) 70 mg tablet Take 70 mg by mouth once a week      Calcium 500 MG tablet Take 500 mg by mouth daily      calcium carbonate-vitamin D (OSCAL-D) 500 mg-200 units per tablet Take 1 tablet by mouth 2 (two) times a day with meals      Cholecalciferol (VITAMIN D) 2000 units tablet Take 2,000 Units by mouth daily      estradiol (ESTRACE) 0 1 mg/g vaginal cream estradiol 0 01% (0 1 mg/gram) vaginal cream   APPLY A SMALL AMOUNT AT THE URETHRAL OPENING 3X A WEEK      levothyroxine 88 mcg tablet Take 88 mcg by mouth daily      Lutein 20 MG TABS Take 20 mg by mouth daily      Menaquinone-7 (Vitamin K2) 100 MCG CAPS Take by mouth      Multiple Vitamin (MULTI-VITAMIN DAILY) TABS Take 1 tablet by mouth daily        No current facility-administered medications for this visit       Allergies   Allergen Reactions    Latex      Latex gloves caused peeling of finger tips    Phenobarbital GI Intolerance     May be per pt /years and years ago        Immunizations:     Immunization History   Administered Date(s) Administered    COVID-19 MODERNA VACC 0 5 ML IM 01/28/2021, 03/05/2021, 11/12/2021    H1N1, All Formulations 01/12/2010    Hep A, adult 10/10/2011, 04/23/2012    INFLUENZA 12/05/2001, 10/23/2003, 11/08/2006, 10/24/2007, 10/20/2008, 10/13/2009, 10/06/2010, 10/13/2011, 10/09/2012, 10/17/2013, 10/16/2014, 10/19/2015, 10/07/2016, 10/02/2017, 09/11/2018, 10/14/2019, 10/05/2020    Influenza Split High Dose Preservative Free IM 10/19/2015, 10/07/2016, 10/02/2017, 09/11/2018, 10/06/2021    Pneumococcal Conjugate 13-Valent 05/19/2015    Pneumococcal Polysaccharide PPV23 02/28/2012    Td (adult), Unspecified 10/24/2007    Tdap 10/24/2017    Zoster 03/12/2009, 07/23/2018, 11/13/2018    Zoster Vaccine Recombinant 07/23/2018, 11/13/2018    influenza, trivalent, adjuvanted 10/14/2019      Health Maintenance:         Topic Date Due    DXA SCAN  12/18/2021    Colorectal Cancer Screening  12/05/2022    Breast Cancer Screening: Mammogram  08/05/2023    Hepatitis C Screening  Completed         Topic Date Due    COVID-19 Vaccine (4 - Booster for Moderna series) 03/12/2022    Influenza Vaccine (1) 09/01/2022      Medicare Screening Tests and Risk Assessments:     Mayra is here for her Subsequent Wellness visit  Last Medicare Wellness visit information reviewed, patient interviewed and updates made to the record today  Health Risk Assessment:   Patient rates overall health as excellent  Patient feels that their physical health rating is same  Patient is very satisfied with their life  Eyesight was rated as same  Hearing was rated as same  Patient feels that their emotional and mental health rating is same  Patients states they are never, rarely angry  Patient states they are never, rarely unusually tired/fatigued  Pain experienced in the last 7 days has been none  Patient states that she has experienced no weight loss or gain in last 6 months  Depression Screening:   PHQ-2 Score: 0      Fall Risk Screening:    In the past year, patient has experienced: no history of falling in past year      Urinary Incontinence Screening:   Patient has not leaked urine accidently in the last six months  Home Safety:  Patient does not have trouble with stairs inside or outside of their home  Patient has working smoke alarms and has working carbon monoxide detector  Home safety hazards include: none  Nutrition:   Current diet is Regular and Limited junk food  Medications:   Patient is currently taking over-the-counter supplements  OTC medications include: see medication list  Patient is able to manage medications  Activities of Daily Living (ADLs)/Instrumental Activities of Daily Living (IADLs):   Walk and transfer into and out of bed and chair?: Yes  Dress and groom yourself?: Yes    Bathe or shower yourself?: Yes    Feed yourself? Yes  Do your laundry/housekeeping?: Yes  Manage your money, pay your bills and track your expenses?: Yes  Make your own meals?: Yes    Do your own shopping?: Yes    Previous Hospitalizations:   Any hospitalizations or ED visits within the last 12 months?: No    How many hospitalizations have you had in the last year?: 1-2    Advance Care Planning:   Living will: Yes    Durable POA for healthcare:  Yes    Advanced directive: Yes      Cognitive Screening:   Provider or family/friend/caregiver concerned regarding cognition?: No    PREVENTIVE SCREENINGS      Cardiovascular Screening:    General: Screening Current      Diabetes Screening:     General: Screening Current      Colorectal Cancer Screening:     General: Screening Current      Breast Cancer Screening:     General: Screening Current      Cervical Cancer Screening:    General: Screening Not Indicated      Osteoporosis Screening:    General: Screening Not Indicated and History Osteoporosis      Abdominal Aortic Aneurysm (AAA) Screening:        General: Screening Not Indicated      Lung Cancer Screening:     General: Screening Not Indicated      Hepatitis C Screening:    General: Screening Current    Screening, Brief Intervention, and Referral to Treatment (SBIRT)    Screening  Typical number of drinks in a day: 0  Typical number of drinks in a week: 0  Interpretation: Low risk drinking behavior  Single Item Drug Screening:  How often have you used an illegal drug (including marijuana) or a prescription medication for non-medical reasons in the past year? never    Single Item Drug Screen Score: 0  Interpretation: Negative screen for possible drug use disorder    No exam data present     Physical Exam:     /86   Pulse 77   Temp (!) 97 1 °F (36 2 °C)   Ht 5' 3" (1 6 m)   Wt 55 6 kg (122 lb 9 6 oz)   SpO2 99%   BMI 21 72 kg/m²     Physical Exam  Vitals and nursing note reviewed  Constitutional:       General: She is not in acute distress  Appearance: She is well-developed  HENT:      Head: Normocephalic and atraumatic  Right Ear: Tympanic membrane, ear canal and external ear normal       Left Ear: Tympanic membrane, ear canal and external ear normal       Nose: Nose normal       Mouth/Throat:      Mouth: Mucous membranes are moist       Pharynx: No oropharyngeal exudate or posterior oropharyngeal erythema  Eyes:      Extraocular Movements: Extraocular movements intact  Conjunctiva/sclera: Conjunctivae normal       Pupils: Pupils are equal, round, and reactive to light  Cardiovascular:      Rate and Rhythm: Normal rate and regular rhythm  Heart sounds: No murmur heard  No friction rub  No gallop  Pulmonary:      Effort: Pulmonary effort is normal  No respiratory distress  Breath sounds: Normal breath sounds  Abdominal:      Palpations: Abdomen is soft  Tenderness: There is no abdominal tenderness  Musculoskeletal:      Cervical back: Normal range of motion and neck supple  Lymphadenopathy:      Cervical: No cervical adenopathy  Skin:     General: Skin is warm and dry  Neurological:      General: No focal deficit present  Mental Status: She is alert and oriented to person, place, and time  Sensory: No sensory deficit  Motor: No weakness        Deep Tendon Reflexes: Reflexes normal    Psychiatric:         Mood and Affect: Mood normal          Behavior: Behavior normal           Erica Vitale DO

## 2022-09-06 ENCOUNTER — APPOINTMENT (OUTPATIENT)
Dept: LAB | Facility: MEDICAL CENTER | Age: 77
End: 2022-09-06
Payer: MEDICARE

## 2022-09-06 DIAGNOSIS — R73.01 IMPAIRED FASTING GLUCOSE: ICD-10-CM

## 2022-09-06 DIAGNOSIS — Z00.00 MEDICARE ANNUAL WELLNESS VISIT, SUBSEQUENT: ICD-10-CM

## 2022-09-06 LAB
ALBUMIN SERPL BCP-MCNC: 3.7 G/DL (ref 3.5–5)
ALP SERPL-CCNC: 66 U/L (ref 46–116)
ALT SERPL W P-5'-P-CCNC: 23 U/L (ref 12–78)
ANION GAP SERPL CALCULATED.3IONS-SCNC: 3 MMOL/L (ref 4–13)
AST SERPL W P-5'-P-CCNC: 15 U/L (ref 5–45)
BILIRUB SERPL-MCNC: 1.41 MG/DL (ref 0.2–1)
BUN SERPL-MCNC: 17 MG/DL (ref 5–25)
CALCIUM SERPL-MCNC: 9 MG/DL (ref 8.3–10.1)
CHLORIDE SERPL-SCNC: 104 MMOL/L (ref 96–108)
CHOLEST SERPL-MCNC: 173 MG/DL
CO2 SERPL-SCNC: 30 MMOL/L (ref 21–32)
CREAT SERPL-MCNC: 0.94 MG/DL (ref 0.6–1.3)
EST. AVERAGE GLUCOSE BLD GHB EST-MCNC: 126 MG/DL
GFR SERPL CREATININE-BSD FRML MDRD: 59 ML/MIN/1.73SQ M
GLUCOSE P FAST SERPL-MCNC: 92 MG/DL (ref 65–99)
HBA1C MFR BLD: 6 %
HDLC SERPL-MCNC: 74 MG/DL
LDLC SERPL CALC-MCNC: 85 MG/DL (ref 0–100)
NONHDLC SERPL-MCNC: 99 MG/DL
POTASSIUM SERPL-SCNC: 4.4 MMOL/L (ref 3.5–5.3)
PROT SERPL-MCNC: 7 G/DL (ref 6.4–8.4)
SODIUM SERPL-SCNC: 137 MMOL/L (ref 135–147)
TRIGL SERPL-MCNC: 70 MG/DL

## 2022-09-06 PROCEDURE — 36415 COLL VENOUS BLD VENIPUNCTURE: CPT

## 2022-09-06 PROCEDURE — 83036 HEMOGLOBIN GLYCOSYLATED A1C: CPT

## 2022-09-06 PROCEDURE — 80061 LIPID PANEL: CPT

## 2022-09-06 PROCEDURE — 80053 COMPREHEN METABOLIC PANEL: CPT

## 2023-06-21 ENCOUNTER — TELEPHONE (OUTPATIENT)
Dept: FAMILY MEDICINE CLINIC | Facility: CLINIC | Age: 78
End: 2023-06-21

## 2023-06-21 NOTE — TELEPHONE ENCOUNTER
Provider:Dr Arnie Lopez    Name of form:[employer]     From placed:Dr Sabrina Padilla to do folder    Form to be faxed (fax #), mailed (address), or picked up (by whom):mail     Patient was made aware of the 7-10 business day form policy

## 2023-06-22 ENCOUNTER — OFFICE VISIT (OUTPATIENT)
Dept: FAMILY MEDICINE CLINIC | Facility: CLINIC | Age: 78
End: 2023-06-22
Payer: MEDICARE

## 2023-06-22 VITALS
WEIGHT: 129.6 LBS | BODY MASS INDEX: 22.96 KG/M2 | SYSTOLIC BLOOD PRESSURE: 122 MMHG | RESPIRATION RATE: 16 BRPM | HEART RATE: 82 BPM | HEIGHT: 63 IN | DIASTOLIC BLOOD PRESSURE: 64 MMHG | OXYGEN SATURATION: 99 % | TEMPERATURE: 96.8 F

## 2023-06-22 DIAGNOSIS — N30.01 ACUTE CYSTITIS WITH HEMATURIA: ICD-10-CM

## 2023-06-22 DIAGNOSIS — M81.0 AGE-RELATED OSTEOPOROSIS WITHOUT CURRENT PATHOLOGICAL FRACTURE: ICD-10-CM

## 2023-06-22 DIAGNOSIS — N32.89 BLADDER SPASMS: ICD-10-CM

## 2023-06-22 DIAGNOSIS — N18.31 STAGE 3A CHRONIC KIDNEY DISEASE (HCC): ICD-10-CM

## 2023-06-22 DIAGNOSIS — R35.0 URINARY FREQUENCY: Primary | ICD-10-CM

## 2023-06-22 LAB
BACTERIA UR QL AUTO: ABNORMAL /HPF
BILIRUB UR QL STRIP: NEGATIVE
CLARITY UR: ABNORMAL
COLOR UR: YELLOW
GLUCOSE UR STRIP-MCNC: NEGATIVE MG/DL
HGB UR QL STRIP.AUTO: ABNORMAL
KETONES UR STRIP-MCNC: NEGATIVE MG/DL
LEUKOCYTE ESTERASE UR QL STRIP: ABNORMAL
NITRITE UR QL STRIP: NEGATIVE
NON-SQ EPI CELLS URNS QL MICRO: ABNORMAL /HPF
PH UR STRIP.AUTO: 7.5 [PH]
PROT UR STRIP-MCNC: ABNORMAL MG/DL
RBC #/AREA URNS AUTO: ABNORMAL /HPF
SL AMB  POCT GLUCOSE, UA: ABNORMAL
SL AMB LEUKOCYTE ESTERASE,UA: ABNORMAL
SL AMB POCT BILIRUBIN,UA: ABNORMAL
SL AMB POCT BLOOD,UA: ABNORMAL
SL AMB POCT CLARITY,UA: ABNORMAL
SL AMB POCT COLOR,UA: YELLOW
SL AMB POCT KETONES,UA: ABNORMAL
SL AMB POCT NITRITE,UA: ABNORMAL
SL AMB POCT PH,UA: 7.5
SL AMB POCT SPECIFIC GRAVITY,UA: 1.01
SL AMB POCT URINE PROTEIN: ABNORMAL
SL AMB POCT UROBILINOGEN: ABNORMAL
SP GR UR STRIP.AUTO: 1.01 (ref 1–1.03)
UROBILINOGEN UR STRIP-ACNC: <2 MG/DL
WBC #/AREA URNS AUTO: ABNORMAL /HPF

## 2023-06-22 PROCEDURE — 87086 URINE CULTURE/COLONY COUNT: CPT | Performed by: FAMILY MEDICINE

## 2023-06-22 PROCEDURE — 81003 URINALYSIS AUTO W/O SCOPE: CPT | Performed by: FAMILY MEDICINE

## 2023-06-22 PROCEDURE — 81001 URINALYSIS AUTO W/SCOPE: CPT | Performed by: FAMILY MEDICINE

## 2023-06-22 PROCEDURE — 99214 OFFICE O/P EST MOD 30 MIN: CPT | Performed by: FAMILY MEDICINE

## 2023-06-22 PROCEDURE — 87077 CULTURE AEROBIC IDENTIFY: CPT | Performed by: FAMILY MEDICINE

## 2023-06-22 PROCEDURE — 87186 SC STD MICRODIL/AGAR DIL: CPT | Performed by: FAMILY MEDICINE

## 2023-06-22 RX ORDER — PHENAZOPYRIDINE HYDROCHLORIDE 100 MG/1
100 TABLET, FILM COATED ORAL
Qty: 6 TABLET | Refills: 0 | Status: SHIPPED | OUTPATIENT
Start: 2023-06-22 | End: 2023-06-24

## 2023-06-22 NOTE — ASSESSMENT & PLAN NOTE
Lab Results   Component Value Date    EGFR 59 09/06/2022    EGFR 56 03/15/2022    EGFR 67 06/23/2021    CREATININE 0 94 09/06/2022    CREATININE 0 98 03/15/2022    CREATININE 0 86 06/23/2021     Stable  Fosamax recently D/C per Endo

## 2023-06-22 NOTE — PROGRESS NOTES
Assessment/Plan:  Problem List Items Addressed This Visit        Musculoskeletal and Integument    Osteoporosis     On Fosamax holiday per Endo Dr Sylvia Segovia  Dexa is up to date  Genitourinary    Stage 3a chronic kidney disease [facility])     Lab Results   Component Value Date    EGFR 59 09/06/2022    EGFR 56 03/15/2022    EGFR 67 06/23/2021    CREATININE 0 94 09/06/2022    CREATININE 0 98 03/15/2022    CREATININE 0 86 06/23/2021     Stable  Fosamax recently D/C per Endo  Other Visit Diagnoses     Urinary frequency    -  Primary    Relevant Medications    phenazopyridine (PYRIDIUM) 100 mg tablet    Other Relevant Orders    Urinalysis with microscopic    Urine culture    POCT urine dip auto non-scope (Completed)    Stable U/A  Pending labs  Start Pyridium  Push fluids  Bladder spasms        Relevant Medications    phenazopyridine (PYRIDIUM) 100 mg tablet    See above  Return if symptoms worsen or fail to improve  No future appointments  Subjective:     Nika Rivera is a 70 y o  female who presents today for a follow-up on her acute medical conditions  HPI:  Chief Complaint   Patient presents with   • Urinary Tract Infection     Pt reports sx started yesterday  Reports cloudy urine, bladder spasms, frequency  Denies pain, burning, fever, and flank pain  -- Above per clinical staff and reviewed  --    HPI      Today:      Urinary symptoms - Occurring x 1 day  +Frequency, bladder spasms, cloudy urine  Pushing fluids  Not using OTC meds  Last UTI 3 years  Improved since starting topical Estrace per [facility]        PHQ-2/9 Depression Screening    Little interest or pleasure in doing things: 0 - not at all  Feeling down, depressed, or hopeless: 0 - not at all           The following portions of the patient's history were reviewed and updated as appropriate: allergies, current medications, past family history, past medical history, past social history, past "surgical history and problem list       Review of Systems   Constitutional: Negative for appetite change, chills, diaphoresis, fatigue and fever  Respiratory: Negative for chest tightness and shortness of breath  Cardiovascular: Negative for chest pain  Gastrointestinal: Negative for abdominal pain, blood in stool, diarrhea, nausea and vomiting  Genitourinary: Positive for frequency  Negative for dysuria, flank pain and urgency  Current Outpatient Medications   Medication Sig Dispense Refill   • acetaminophen (TYLENOL) 500 mg tablet Take 500 mg by mouth every 6 (six) hours as needed for mild pain     • Calcium 500 MG tablet Take 500 mg by mouth daily     • calcium carbonate-vitamin D (OSCAL-D) 500 mg-200 units per tablet Take 1 tablet by mouth 2 (two) times a day with meals     • estradiol (ESTRACE) 0 1 mg/g vaginal cream      • levothyroxine 88 mcg tablet Take 88 mcg by mouth daily     • Lutein 20 MG TABS Take 20 mg by mouth daily     • Menaquinone-7 (Vitamin K2) 100 MCG CAPS Take by mouth     • Multiple Vitamin (MULTI-VITAMIN DAILY) TABS Take 1 tablet by mouth daily      • phenazopyridine (PYRIDIUM) 100 mg tablet Take 1 tablet (100 mg total) by mouth 3 (three) times a day with meals for 2 days 6 tablet 0   • Cholecalciferol (VITAMIN D) 2000 units tablet Take 2,000 Units by mouth daily (Patient not taking: Reported on 1/19/2023)     • Na Sulfate-K Sulfate-Mg Sulf (Suprep Bowel Prep Kit) 17 5-3 13-1 6 GM/177ML SOLN Take 2 Bottles by mouth once for 1 dose 354 mL 0     No current facility-administered medications for this visit         Objective:  /64   Pulse 82   Temp (!) 96 8 °F (36 °C)   Resp 16   Ht 5' 3\" (1 6 m)   Wt 58 8 kg (129 lb 9 6 oz)   SpO2 99%   BMI 22 96 kg/m²    Wt Readings from Last 3 Encounters:   06/22/23 58 8 kg (129 lb 9 6 oz)   01/19/23 56 7 kg (125 lb)   09/02/22 55 6 kg (122 lb 9 6 oz)      BP Readings from Last 3 Encounters:   06/22/23 122/64   01/19/23 110/58 " "  09/02/22 128/86          Physical Exam  Vitals and nursing note reviewed  Constitutional:       Appearance: Normal appearance  She is well-developed and normal weight  HENT:      Head: Normocephalic and atraumatic  Mouth/Throat:      Mouth: Mucous membranes are moist    Eyes:      Conjunctiva/sclera: Conjunctivae normal    Neck:      Thyroid: No thyromegaly  Cardiovascular:      Rate and Rhythm: Normal rate and regular rhythm  Pulses: Normal pulses  Heart sounds: Normal heart sounds  Pulmonary:      Effort: Pulmonary effort is normal       Breath sounds: Normal breath sounds  Abdominal:      General: Bowel sounds are normal  There is no distension  Palpations: Abdomen is soft  There is no mass  Tenderness: There is no abdominal tenderness  There is no right CVA tenderness, left CVA tenderness, guarding or rebound  Musculoskeletal:         General: No swelling  Cervical back: Neck supple  Right lower leg: No edema  Left lower leg: No edema  Lymphadenopathy:      Cervical: No cervical adenopathy  Neurological:      General: No focal deficit present  Mental Status: She is alert and oriented to person, place, and time  Psychiatric:         Mood and Affect: Mood normal          Lab Results:      Lab Results   Component Value Date    WBC 6 64 09/17/2019    HGB 14 3 09/17/2019    HCT 45 2 09/17/2019     09/17/2019    TRIG 70 09/06/2022    HDL 74 09/06/2022    ALT 23 09/06/2022    AST 15 09/06/2022    K 4 4 09/06/2022     09/06/2022    CREATININE 0 94 09/06/2022    BUN 17 09/06/2022    CO2 30 09/06/2022    GLUF 92 09/06/2022    HGBA1C 6 0 (H) 09/06/2022     No results found for: \"URICACID\"  Invalid input(s): \"BASENAME\" Vitamin D    No results found       POCT Labs                       "

## 2023-06-23 ENCOUNTER — TELEPHONE (OUTPATIENT)
Dept: ADMINISTRATIVE | Facility: OTHER | Age: 78
End: 2023-06-23

## 2023-06-23 RX ORDER — CEPHALEXIN 500 MG/1
500 CAPSULE ORAL 2 TIMES DAILY
Qty: 14 CAPSULE | Refills: 0 | Status: SHIPPED | OUTPATIENT
Start: 2023-06-23 | End: 2023-06-26

## 2023-06-23 NOTE — TELEPHONE ENCOUNTER
----- Message from Marcin Bermudez DO sent at 6/22/2023 12:22 PM EDT -----  Regarding: Dexa 12/21/22 & Mammo 8/5/22 @ [facility]  06/22/23 12:23 PM    Hello, our patient Garo Miller has had DEXA Scan and Mammogram completed/performed  Please assist in updating the patient chart by pulling the Care Everywhere (CE) document  The date of service is        Dexa 12/21/22 & Mammo 8/5/22 @ [facility]    Thank you,  Tabitha Gonzalez DO  [facility] Query

## 2023-06-23 NOTE — TELEPHONE ENCOUNTER
Upon review of the In Basket request we were able to locate, review, and update the patient chart as requested for DEXA Scan and Mammogram     Any additional questions or concerns should be emailed to the Practice Liaisons via the appropriate education email address, please do not reply via In Basket      Thank you  Jessica Pagan MA

## 2023-06-23 NOTE — RESULT ENCOUNTER NOTE
Urinalysis is suspicious for urinary tract infection  Will start antibiotics while awaiting urine culture  E Rx sent for Keflex 500 mg twice daily x 7 days  Message sent to patient via [employer] patient portal     Nurse to also call patient

## 2023-06-24 LAB
BACTERIA UR CULT: ABNORMAL
BACTERIA UR CULT: ABNORMAL

## 2023-06-26 RX ORDER — NITROFURANTOIN 25; 75 MG/1; MG/1
100 CAPSULE ORAL 2 TIMES DAILY
Qty: 10 CAPSULE | Refills: 0 | Status: SHIPPED | OUTPATIENT
Start: 2023-06-26 | End: 2023-07-01

## 2023-06-26 NOTE — RESULT ENCOUNTER NOTE
Urine culture is positive for urinary tract infection, but bacteria is not susceptible to Keflex antibiotic-please discontinue  Instead start different antibiotic Macrobid 1 pill twice daily x5 days  Rx sent  Message sent to patient via [employer] patient portal     Nurse to also call patient

## 2023-10-03 ENCOUNTER — OFFICE VISIT (OUTPATIENT)
Dept: FAMILY MEDICINE CLINIC | Facility: CLINIC | Age: 78
End: 2023-10-03
Payer: MEDICARE

## 2023-10-03 VITALS
RESPIRATION RATE: 14 BRPM | HEART RATE: 112 BPM | OXYGEN SATURATION: 99 % | SYSTOLIC BLOOD PRESSURE: 144 MMHG | WEIGHT: 127.8 LBS | DIASTOLIC BLOOD PRESSURE: 72 MMHG | HEIGHT: 63 IN | BODY MASS INDEX: 22.64 KG/M2

## 2023-10-03 DIAGNOSIS — Z13.220 SCREENING FOR LIPOID DISORDERS: ICD-10-CM

## 2023-10-03 DIAGNOSIS — R73.01 IFG (IMPAIRED FASTING GLUCOSE): ICD-10-CM

## 2023-10-03 DIAGNOSIS — Z00.00 MEDICARE ANNUAL WELLNESS VISIT, SUBSEQUENT: Primary | ICD-10-CM

## 2023-10-03 DIAGNOSIS — E55.9 VITAMIN D DEFICIENCY: ICD-10-CM

## 2023-10-03 DIAGNOSIS — C73 MALIGNANT NEOPLASM OF THYROID GLAND (HCC): ICD-10-CM

## 2023-10-03 DIAGNOSIS — R03.0 ELEVATED BLOOD PRESSURE READING IN OFFICE WITHOUT DIAGNOSIS OF HYPERTENSION: ICD-10-CM

## 2023-10-03 PROCEDURE — G0438 PPPS, INITIAL VISIT: HCPCS | Performed by: FAMILY MEDICINE

## 2023-10-03 NOTE — PROGRESS NOTES
Assessment and Plan:     Problem List Items Addressed This Visit        Endocrine    Malignant neoplasm of thyroid gland ([address])  Continue f/u with dr Flor Bone     Other Visit Diagnoses     Medicare annual wellness visit, subsequent    -  Primary    doing great. obtain labs, but otherwise up to date preventively  continue healthy diet and exercise    IFG (impaired fasting glucose)        Relevant Orders    Comprehensive metabolic panel    Hemoglobin A1C    Screening for lipoid disorders        Relevant Orders    Lipid panel    Vitamin D deficiency        Relevant Orders    Vitamin D 25 hydroxy     Elevated blood pressure without history of HTN  Pt to monitor pressures at home for the next month and send them to me for review  Isolated mildly high blood pressure, so will hold on treatment until we see trend         Depression Screening and Follow-up Plan: Patient was screened for depression during today's encounter. They screened negative with a PHQ-2 score of 0. Preventive health issues were discussed with patient, and age appropriate screening tests were ordered as noted in patient's After Visit Summary. Personalized health advice and appropriate referrals for health education or preventive services given if needed, as noted in patient's After Visit Summary. History of Present Illness:     Patient presents for a Medicare Wellness Visit. Feeling well in general.  No complaints. Due for labs. Seeing dr Jung Mann for her thyroid and osteoporosis. Up to date on imm's. Has living will. Blood pressure higher than usual today. Takes at home. No chest pain, shortness of breath, n/v, abd pain, visual changes, paresthesias, weakness. HPI   Patient Care Team:  Renford Castleman, DO as PCP - General (Family Medicine)     Review of Systems:     Review of Systems   Constitutional: Negative for chills, fatigue, fever and unexpected weight change.    HENT: Negative for congestion, ear pain, hearing loss, postnasal drip, rhinorrhea, sinus pressure, sinus pain, sore throat, trouble swallowing and voice change. Eyes: Negative for pain, redness and visual disturbance. Respiratory: Negative for cough and shortness of breath. Cardiovascular: Negative for chest pain, palpitations and leg swelling. Gastrointestinal: Negative for abdominal pain, constipation, diarrhea and nausea. Endocrine: Negative for cold intolerance, heat intolerance, polydipsia and polyuria. Genitourinary: Negative for dysuria, frequency and urgency. Musculoskeletal: Negative for arthralgias, joint swelling and myalgias. Skin: Negative for rash. No suspicious lesions   Neurological: Negative for weakness, numbness and headaches. Hematological: Negative for adenopathy.         Problem List:     Patient Active Problem List   Diagnosis   • Antithyroid agents causing adverse effect in therapeutic use   • Cardiac arrhythmia   • Family history of malignant neoplasm of breast   • Impaired fasting glucose   • Malignant neoplasm of thyroid gland (HCC)   • Mitral valve disorder   • Osteoporosis   • Pericardial effusion   • Basal cell carcinoma (BCC) of supratip of nose   • Cavernous angioma   • Stage 3a chronic kidney disease (HCC)   • History of thyroid cancer      Past Medical and Surgical History:     Past Medical History:   Diagnosis Date   • Anesthesia     "after one foot surgery which was later in the day took longer to wake up "   • Basal cell carcinoma of nose 10/2019   • Cancer ([address]) 1990    Thyroid cancer   • Carcinoma ([address]) 1980    basil cell left ear 1990 and head 2013   • 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   • Colon polyp    • Dental crowns present     one dental implant upper right   • Exercises daily    • Foot pain, left     OR correction today 3/13/2020   • History of mitral valve prolapse     "no problems"   • Hypothyroidism     post surgical   • IFG (impaired fasting glucose)     s/p thyroidectomy and PALOMO   • Osteoporosis    • Pericardial effusion     "sees cardiologist regularly, being monitored and no symptoms found incidentally on an echo"   • Thyroid ca [facility])    • Tinnitus    • Urinary tract infection 2020   • Varicella    • Wears glasses    • Wears glasses      Past Surgical History:   Procedure Laterality Date   • ABDOMINAL SURGERY  01/01/1980    adhesion removal     • APPENDECTOMY     • BRANCHIAL CLEFT CYST EXCISION  1980   • CATARACT EXTRACTION Bilateral    • COLECTOMY Right 03/2019    right ellis   • COLON SURGERY  1980    for adhesions   • COLONOSCOPY  12/2017    Chey Jacbo MD [facility]  No bx's   • COLONOSCOPY  10/2012    ARIELLE Leigh MD [facility] Normal   • FOOT NEUROMA SURGERY Right     R foot 2nd toes     • FOOT SURGERY Left    • FOOT TENDON SURGERY Left 03/13/2020    Procedure: FDL TENDON TRANSFER;  Surgeon: Jon Alvarenga DPM;  Location: [facility];  Service: Podiatry   • MOHS RECONSTRUCTION N/A 10/02/2019    Procedure: NOSE MOHS RECONSTRUCTION;  Surgeon: Sharon Carvalho MD;  Location: [facility];  Service: Plastics   • SKIN CANCER EXCISION      BCC   • THYROIDECTOMY      thyroid cancer     • TOE OSTEOTOMY Left 03/13/2020    Procedure: 2ND MTPJ EXPLOR.  W/ REMOVAL RETAINED HARDWARE; 2ND MET OSTEOTOMY, FUSION 2ND TOE, 1ST TOE AND MET OSTEOTOMY; FLEXOR RELEASE 3RD AND 4TH TOES;  Surgeon: Jon Alvarenga DPM;  Location[facility];  Service: Podiatry   • TONSILLECTOMY AND ADENOIDECTOMY     • TUBAL LIGATION     • VAGINAL HYSTERECTOMY        Family History:     Family History   Problem Relation Age of Onset   • Heart disease Mother    • Diabetes type II Father    • Heart disease Father    • Diabetes Father         Type 2   • Thyroid cancer Sister    • Osteoarthritis Sister    • Thyroid cancer Brother    • Diabetes type II Brother    • Diabetes Brother         Type 2   • Heart disease Brother    • No Known Problems Son    • No Known Problems Daughter       Social History:     Social History     Socioeconomic History   • Marital status: /Civil Union     Spouse name: Ann Tesfaye    • Number of children: 2   • Years of education: None   • Highest education level: None   Occupational History   • Occupation: Retired/public health nurse   Tobacco Use   • Smoking status: Former     Packs/day: 1.50     Years: 8.00     Total pack years: 12.00     Types: Cigarettes     Start date: 1962     Quit date: 1970     Years since quittin.7   • Smokeless tobacco: Never   Vaping Use   • Vaping Use: Never used   Substance and Sexual Activity   • Alcohol use: Yes     Alcohol/week: 4.0 - 5.0 standard drinks of alcohol     Types: 4 - 5 Glasses of wine per week   • Drug use: Never   • Sexual activity: Yes     Partners: Male     Birth control/protection: Post-menopausal, None   Other Topics Concern   • None   Social History Narrative   • None     Social Determinants of Health     Financial Resource Strain: Low Risk  (2023)    Overall Financial Resource Strain (CARDIA)    • Difficulty of Paying Living Expenses: Not hard at all   Food Insecurity: No Food Insecurity (10/3/2023)    Hunger Vital Sign    • Worried About Running Out of Food in the Last Year: Never true    • Ran Out of Food in the Last Year: Never true   Transportation Needs: No Transportation Needs (2023)    PRAPARE - Transportation    • Lack of Transportation (Medical): No    • Lack of Transportation (Non-Medical):  No   Physical Activity: Not on file   Stress: Not on file   Social Connections: Not on file   Intimate Partner Violence: Not on file   Housing Stability: Low Risk  (10/3/2023)    Housing Stability Vital Sign    • Unable to Pay for Housing in the Last Year: No    • Number of Places Lived in the Last Year: 1    • Unstable Housing in the Last Year: No      Medications and Allergies:     Current Outpatient Medications   Medication Sig Dispense Refill   • acetaminophen (TYLENOL) 500 mg tablet Take 500 mg by mouth every 6 (six) hours as needed for mild pain     • Calcium 500 MG tablet Take 500 mg by mouth daily     • calcium carbonate-vitamin D (OSCAL-D) 500 mg-200 units per tablet Take 1 tablet by mouth 2 (two) times a day with meals     • Cholecalciferol (VITAMIN D) 2000 units tablet Take 2,000 Units by mouth daily     • estradiol (ESTRACE) 0.1 mg/g vaginal cream      • levothyroxine 88 mcg tablet Take 88 mcg by mouth daily     • Lutein 20 MG TABS Take 20 mg by mouth daily     • Menaquinone-7 (Vitamin K2) 100 MCG CAPS Take by mouth     • Multiple Vitamin (MULTI-VITAMIN DAILY) TABS Take 1 tablet by mouth daily        No current facility-administered medications for this visit.      Allergies   Allergen Reactions   • Latex      Latex gloves caused peeling of finger tips   • Phenobarbital GI Intolerance     May be per pt /years and years ago        Immunizations:     Immunization History   Administered Date(s) Administered   • COVID-19 MODERNA VACC 0.5 ML IM 01/28/2021, 03/05/2021, 11/12/2021, 04/29/2022   • COVID-19 Moderna Vac BIVALENT 12 Yr+ IM 0.5 ML 10/14/2022, 07/28/2023   • H1N1, All Formulations 01/12/2010   • Hep A, adult 10/10/2011, 04/23/2012   • INFLUENZA 12/05/2001, 10/23/2003, 11/08/2006, 10/24/2007, 10/20/2008, 10/13/2009, 10/06/2010, 10/13/2011, 10/09/2012, 10/17/2013, 10/16/2014, 10/19/2015, 10/07/2016, 10/02/2017, 09/11/2018, 10/14/2019, 10/05/2020, 10/06/2021, 09/23/2022   • Influenza Split High Dose Preservative Free IM 10/19/2015, 10/07/2016, 10/02/2017, 09/11/2018, 10/06/2021   • Pneumococcal Conjugate 13-Valent 05/19/2015   • Pneumococcal Polysaccharide PPV23 02/28/2012   • Td (adult), Unspecified 10/24/2007   • Tdap 10/24/2017   • Zoster 03/12/2009, 07/23/2018, 11/13/2018   • Zoster Vaccine Recombinant 07/23/2018, 11/13/2018   • influenza, trivalent, adjuvanted 10/14/2019      Health Maintenance:         Topic Date Due   • Breast Cancer Screening: Mammogram  08/05/2023   • DXA SCAN  12/21/2023   • Colorectal Cancer Screening  02/14/2028   • Hepatitis C Screening Completed     There are no preventive care reminders to display for this patient. Medicare Screening Tests and Risk Assessments:     Mayra is here for her Subsequent Wellness visit. Health Risk Assessment:   Patient rates overall health as excellent. Patient feels that their physical health rating is same. Patient is very satisfied with their life. Eyesight was rated as same. Hearing was rated as same. Patient feels that their emotional and mental health rating is same. Patients states they are never, rarely angry. Patient states they are never, rarely unusually tired/fatigued. Pain experienced in the last 7 days has been none. Patient states that she has experienced no weight loss or gain in last 6 months. Depression Screening:   PHQ-2 Score: 0      Fall Risk Screening: In the past year, patient has experienced: no history of falling in past year      Urinary Incontinence Screening:   Patient has leaked urine accidently in the last six months. Home Safety:  Patient does not have trouble with stairs inside or outside of their home. Patient has working smoke alarms and has working carbon monoxide detector. Home safety hazards include: none. Nutrition:   Current diet is Regular. Medications:   Patient is currently taking over-the-counter supplements. OTC medications include: Vitamins. Patient is able to manage medications. Activities of Daily Living (ADLs)/Instrumental Activities of Daily Living (IADLs):   Walk and transfer into and out of bed and chair?: Yes  Dress and groom yourself?: Yes    Bathe or shower yourself?: Yes    Feed yourself? Yes  Do your laundry/housekeeping?: Yes  Manage your money, pay your bills and track your expenses?: Yes  Make your own meals?: Yes    Do your own shopping?: Yes    Durable Medical Equipment Suppliers  N/A    Previous Hospitalizations:   Any hospitalizations or ED visits within the last 12 months?: No      Advance Care Planning:   Living will:  Yes Durable POA for healthcare: Yes    Advanced directive: Yes      PREVENTIVE SCREENINGS      Cardiovascular Screening:    General: Screening Current      Colorectal Cancer Screening:     General: Screening Current      Breast Cancer Screening:     General: Screening Current      Cervical Cancer Screening:    General: Screening Not Indicated      Osteoporosis Screening:    General: Screening Not Indicated and History Osteoporosis      Abdominal Aortic Aneurysm (AAA) Screening:        General: Screening Not Indicated      Lung Cancer Screening:     General: Screening Not Indicated      Hepatitis C Screening:    General: Screening Current    Screening, Brief Intervention, and Referral to Treatment (SBIRT)    Screening  Typical number of drinks in a day: 1  Typical number of drinks in a week: 6  Interpretation: Low risk drinking behavior. AUDIT-C Screenin) How often did you have a drink containing alcohol in the past year? 4 or more times a week  2) How many drinks did you have on a typical day when you were drinking in the past year? 1 to 2  3) How often did you have 6 or more drinks on one occasion in the past year? never    AUDIT-C Score: 4  Interpretation: Score 3-12 (female): POSITIVE screen for alcohol misuse    AUDIT Screenin) How often during the last year have you found that you were not able to stop drinking once you had started? 0 - never  5) How often during the last year have you failed to do what was normally expected from you because of drinking? 0 - never  6) How often during the last year have you needed a first drink in the morning to get yourself going after a heavy drinking session?  0 - never  7) How often during the last year have you had a feeling of guilt or remorse after drinking? 0 - never  8) How often during the last year have you been unable to remember what happened the night before because you had been drinking? 0 - never  9) Have you or someone else been injured as a result of your drinking? 0 - no  10) Has a relative or friend or a doctor or another health worker been concerned about your drinking or suggested you cut down? 0 - no    AUDIT Score: 4  Interpretation: Low risk alcohol consumption    Single Item Drug Screening:  How often have you used an illegal drug (including marijuana) or a prescription medication for non-medical reasons in the past year? never    Single Item Drug Screen Score: 0  Interpretation: Negative screen for possible drug use disorder    No results found. Physical Exam:     /72 (BP Location: Right arm, Patient Position: Sitting, Cuff Size: Standard)   Pulse (!) 112   Resp 14   Ht 5' 3" (1.6 m)   Wt 58 kg (127 lb 12.8 oz)   SpO2 99%   BMI 22.64 kg/m²     Physical Exam  Vitals and nursing note reviewed. Constitutional:       General: She is not in acute distress. Appearance: She is well-developed. HENT:      Head: Normocephalic and atraumatic. Right Ear: Tympanic membrane, ear canal and external ear normal.      Left Ear: Tympanic membrane, ear canal and external ear normal.      Nose: Nose normal.      Mouth/Throat:      Mouth: Mucous membranes are moist.      Pharynx: No oropharyngeal exudate or posterior oropharyngeal erythema. Eyes:      Conjunctiva/sclera: Conjunctivae normal.   Cardiovascular:      Rate and Rhythm: Normal rate and regular rhythm. Heart sounds: No murmur heard. Pulmonary:      Effort: Pulmonary effort is normal. No respiratory distress. Breath sounds: Normal breath sounds. Abdominal:      Palpations: Abdomen is soft. Tenderness: There is no abdominal tenderness. Musculoskeletal:         General: No swelling. Cervical back: Neck supple. Skin:     General: Skin is warm and dry. Capillary Refill: Capillary refill takes less than 2 seconds. Neurological:      General: No focal deficit present. Mental Status: She is alert and oriented to person, place, and time.       Sensory: No sensory deficit. Motor: No weakness.       Gait: Gait normal.      Deep Tendon Reflexes: Reflexes normal.   Psychiatric:         Mood and Affect: Mood normal.          Chucho Blanton DO

## 2023-10-03 NOTE — PATIENT INSTRUCTIONS
Take blood pressures daily  Send readings to me in a month  Obtain labs      Medicare Preventive Visit Patient Instructions  Thank you for completing your Welcome to Medicare Visit or Medicare Annual Wellness Visit today. Your next wellness visit will be due in one year (10/3/2024). The screening/preventive services that you may require over the next 5-10 years are detailed below. Some tests may not apply to you based off risk factors and/or age. Screening tests ordered at today's visit but not completed yet may show as past due. Also, please note that scanned in results may not display below. Preventive Screenings:  Service Recommendations Previous Testing/Comments   Colorectal Cancer Screening  * Colonoscopy    * Fecal Occult Blood Test (FOBT)/Fecal Immunochemical Test (FIT)  * Fecal DNA/Cologuard Test  * Flexible Sigmoidoscopy Age: 43-73 years old   Colonoscopy: every 10 years (may be performed more frequently if at higher risk)  OR  FOBT/FIT: every 1 year  OR  Cologuard: every 3 years  OR  Sigmoidoscopy: every 5 years  Screening may be recommended earlier than age 39 if at higher risk for colorectal cancer. Also, an individualized decision between you and your healthcare provider will decide whether screening between the ages of 77-80 would be appropriate. Colonoscopy: 02/14/2023  FOBT/FIT: Not on file  Cologuard: Not on file  Sigmoidoscopy: Not on file    Screening Current     Breast Cancer Screening Age: 36 years old  Frequency: every 1-2 years  Not required if history of left and right mastectomy Mammogram: 08/05/2022    Screening Current   Cervical Cancer Screening Between the ages of 21-29, pap smear recommended once every 3 years. Between the ages of 32-69, can perform pap smear with HPV co-testing every 5 years.    Recommendations may differ for women with a history of total hysterectomy, cervical cancer, or abnormal pap smears in past. Pap Smear: Not on file    Screening Not Indicated   Hepatitis C Screening Once for adults born between 1945 and 1965  More frequently in patients at high risk for Hepatitis C Hep C Antibody: 06/09/2017    Screening Current   Diabetes Screening 1-2 times per year if you're at risk for diabetes or have pre-diabetes Fasting glucose: 92 mg/dL (9/6/2022)  A1C: 6.0 % (9/6/2022)      Cholesterol Screening Once every 5 years if you don't have a lipid disorder. May order more often based on risk factors. Lipid panel: 09/06/2022    Screening Current     Other Preventive Screenings Covered by Medicare:  Abdominal Aortic Aneurysm (AAA) Screening: covered once if your at risk. You're considered to be at risk if you have a family history of AAA. Lung Cancer Screening: covers low dose CT scan once per year if you meet all of the following conditions: (1) Age 48-67; (2) No signs or symptoms of lung cancer; (3) Current smoker or have quit smoking within the last 15 years; (4) You have a tobacco smoking history of at least 20 pack years (packs per day multiplied by number of years you smoked); (5) You get a written order from a healthcare provider. Glaucoma Screening: covered annually if you're considered high risk: (1) You have diabetes OR (2) Family history of glaucoma OR (3)  aged 48 and older OR (3)  American aged 72 and older  Osteoporosis Screening: covered every 2 years if you meet one of the following conditions: (1) You're estrogen deficient and at risk for osteoporosis based off medical history and other findings; (2) Have a vertebral abnormality; (3) On glucocorticoid therapy for more than 3 months; (4) Have primary hyperparathyroidism; (5) On osteoporosis medications and need to assess response to drug therapy. Last bone density test (DXA Scan): 12/21/2022. HIV Screening: covered annually if you're between the age of 14-79. Also covered annually if you are younger than 13 and older than 72 with risk factors for HIV infection.  For pregnant patients, it is covered up to 3 times per pregnancy. Immunizations:  Immunization Recommendations   Influenza Vaccine Annual influenza vaccination during flu season is recommended for all persons aged >= 6 months who do not have contraindications   Pneumococcal Vaccine   * Pneumococcal conjugate vaccine = PCV13 (Prevnar 13), PCV15 (Vaxneuvance), PCV20 (Prevnar 20)  * Pneumococcal polysaccharide vaccine = PPSV23 (Pneumovax) Adults 20-63 years old: 1-3 doses may be recommended based on certain risk factors  Adults 72 years old: 1-2 doses may be recommended based off what pneumonia vaccine you previously received   Hepatitis B Vaccine 3 dose series if at intermediate or high risk (ex: diabetes, end stage renal disease, liver disease)   Tetanus (Td) Vaccine - COST NOT COVERED BY MEDICARE PART B Following completion of primary series, a booster dose should be given every 10 years to maintain immunity against tetanus. Td may also be given as tetanus wound prophylaxis. Tdap Vaccine - COST NOT COVERED BY MEDICARE PART B Recommended at least once for all adults. For pregnant patients, recommended with each pregnancy. Shingles Vaccine (Shingrix) - COST NOT COVERED BY MEDICARE PART B  2 shot series recommended in those aged 48 and above     Health Maintenance Due:      Topic Date Due    Breast Cancer Screening: Mammogram  08/05/2023    DXA SCAN  12/21/2023    Colorectal Cancer Screening  02/14/2028    Hepatitis C Screening  Completed     Immunizations Due:  There are no preventive care reminders to display for this patient. Advance Directives   What are advance directives? Advance directives are legal documents that state your wishes and plans for medical care. These plans are made ahead of time in case you lose your ability to make decisions for yourself. Advance directives can apply to any medical decision, such as the treatments you want, and if you want to donate organs. What are the types of advance directives?   There are many types of advance directives, and each state has rules about how to use them. You may choose a combination of any of the following:  Living will: This is a written record of the treatment you want. You can also choose which treatments you do not want, which to limit, and which to stop at a certain time. This includes surgery, medicine, IV fluid, and tube feedings. Durable power of  for healthcare [facility]): This is a written record that states who you want to make healthcare choices for you when you are unable to make them for yourself. This person, called a proxy, is usually a family member or a friend. You may choose more than 1 proxy. Do not resuscitate (DNR) order:  A DNR order is used in case your heart stops beating or you stop breathing. It is a request not to have certain forms of treatment, such as CPR. A DNR order may be included in other types of advance directives. Medical directive: This covers the care that you want if you are in a coma, near death, or unable to make decisions for yourself. You can list the treatments you want for each condition. Treatment may include pain medicine, surgery, blood transfusions, dialysis, IV or tube feedings, and a ventilator (breathing machine). Values history: This document has questions about your views, beliefs, and how you feel and think about life. This information can help others choose the care that you would choose. Why are advance directives important? An advance directive helps you control your care. Although spoken wishes may be used, it is better to have your wishes written down. Spoken wishes can be misunderstood, or not followed. Treatments may be given even if you do not want them. An advance directive may make it easier for your family to make difficult choices about your care. Urinary Incontinence   Urinary incontinence (UI)  is when you lose control of your bladder.  UI develops because your bladder cannot store or empty urine properly. The 3 most common types of UI are stress incontinence, urge incontinence, or both. Medicines:   May be given to help strengthen your bladder control. Report any side effects of medication to your healthcare provider. Do pelvic muscle exercises often:  Your pelvic muscles help you stop urinating. Squeeze these muscles tight for 5 seconds, then relax for 5 seconds. Gradually work up to squeezing for 10 seconds. Do 3 sets of 15 repetitions a day, or as directed. This will help strengthen your pelvic muscles and improve bladder control. Train your bladder:  Go to the bathroom at set times, such as every 2 hours, even if you do not feel the urge to go. You can also try to hold your urine when you feel the urge to go. For example, hold your urine for 5 minutes when you feel the urge to go. As that becomes easier, hold your urine for 10 minutes. Self-care:   Keep a UI record. Write down how often you leak urine and how much you leak. Make a note of what you were doing when you leaked urine. Drink liquids as directed. You may need to limit the amount of liquid you drink to help control your urine leakage. Do not drink any liquid right before you go to bed. Limit or do not have drinks that contain caffeine or alcohol. Prevent constipation. Eat a variety of high-fiber foods. Good examples are high-fiber cereals, beans, vegetables, and whole-grain breads. Walking is the best way to trigger your intestines to have a bowel movement. Exercise regularly and maintain a healthy weight. Weight loss and exercise will decrease pressure on your bladder and help you control your leakage. Use a catheter as directed  to help empty your bladder. A catheter is a tiny, plastic tube that is put into your bladder to drain your urine. Go to behavior therapy as directed. Behavior therapy may be used to help you learn to control your urge to urinate.     Alcohol Use and Your Health    Drinking too much can harm your health. Excessive alcohol use leads to about 88,000 death in [facility] each year, and shortens the life of those who diet by almost 30 years. Further, excessive drinking cost the economy $249 billion in 2010. Most excessive drinkers are not alcohol dependent. Excessive alcohol use has immediate effects that increase the risk of many harmful health conditions. These are most often the result of binge drinking. Over time, excessive alcohol use can lead to the development of chronic diseases and other series health problems. What is considered a "drink"? Excessive alcohol use includes:  Binge Drinking: For women, 4 or more drinks consumed on one occasion. For men, 5 or more drinks consumed on one occasion. Heavy Drinking: For women, 8 or more drinks per week. For men, 15 or more drinks per week  Any alcohol used by pregnant women  Any alcohol used by those under the age of 21 years    If you choose to drink, do so in moderation:  Do not drink at all if you are under the age of 24, or if you are or may be pregnant, or have health problems that could be made worse by drinking.   For women, up to 1 drink per day  For men, up to 2 drinks a day    No one should begin drinking or drink more frequently based on potential health benefits    Short-Term Health Risks:  Injuries: motor vehicle crashes, falls, drownings, burns  Violence: homicide, suicide, sexual assault, intimate partner violence  Alcohol poisoning  Reproductive health: risky sexual behaviors, unintended prengnacy, sexually transmitted diseases, miscarriage, stillbirth, fetal alcohol syndrome    Long-Term Health Risks:  Chronic diseases: high blood pressure, heart disease, stroke, liver disease, digestive problems  Cancers: breast, mouth and throat, liver, colon  Learning and memory problems: dementia, poor school performance  Mental health: depression, anxiety, insomnia  Social problems: lost productivity, family problems, unemployment  Alcohol dependence    For support and more information:  Substance Abuse and [facility] , [address] [place] [address]  Web Address: https://Minervax/    Alcoholics Anonymous        Web Address: http://www.stern.info/    https://www.cdc.gov/alcohol/fact-sheets/alcohol-use.htm     © Copyright [employer] 2018 Information is for End User's use only and may not be sold, redistributed or otherwise used for commercial purposes.  All illustrations and images included in CareNotes® are the copyrighted property of [employer].D.A[employer]M., Inc. or [address] [place] [address]

## 2023-10-04 ENCOUNTER — TELEPHONE (OUTPATIENT)
Dept: ADMINISTRATIVE | Facility: OTHER | Age: 78
End: 2023-10-04

## 2023-10-04 NOTE — TELEPHONE ENCOUNTER
Upon review of the In Basket request we were able to locate, review, and update the patient chart as requested for Mammogram.    Any additional questions or concerns should be emailed to the Practice Liaisons via the appropriate education email address, please do not reply via In Basket.     Thank you  Janessa Mcneil

## 2023-10-04 NOTE — TELEPHONE ENCOUNTER
----- Message from Faviola Diaz sent at 10/3/2023  2:45 PM EDT -----  10/03/23 2:45 PM    Hello, our patient Palmira Paul has had Mammogram completed/performed. Please assist in updating the patient chart by pulling the Care Everywhere (CE) document. The date of service is 09/12/2023.      Thank you,  Tanya Lou PG  Grecia Isaacs

## 2023-11-10 ENCOUNTER — TELEPHONE (OUTPATIENT)
Dept: ADMINISTRATIVE | Facility: OTHER | Age: 78
End: 2023-11-10

## 2023-11-10 NOTE — LETTER
Vaccination Request Form: [id number] aka SARS-CoV-2 (Signa Holy or [place] or [employer]) and Influenza      Date Requested: 11/10/23  Patient: Salma Cooper  Patient : 1945   Referring Provider: Naomie Henderson DO       The above patient has informed us that they have had their   most recent COVID-19 aka SARS-CoV-2 (Signa Holy or [place] or [employer]) and Influenza administered at your facility. Please   complete this form and attach all corresponding documentation. Date of Vaccine(s) Given  ______________________________    Lot Number(s) _______________________________________    Manufacture(s) ______________________________________    Dose Amount (s) _____________________________________    Expiration Date(s) ____________________________________    Comments __________________________________________________________  ____________________________________________________________________  ____________________________________________________________________  ____________________________________________________________________    Administering Facility  ________________________________________________    Vaccine Administered By (print name) ___________________________________      Form Completed By (print name) _______________________________________      Signature ___________________________________________________________      These reports are needed for  compliance. Please fax this completed form and a copy of the Vaccine Document(s) to our office located at [address] [place] [address] as soon as possible to Fax [phone] marcellus Cristobal: Phone [phone]    We thank you for your assistance in treating our mutual patient.

## 2023-11-10 NOTE — TELEPHONE ENCOUNTER
----- Message from [facility] sent at 11/9/2023 11:55 AM EST -----  11/09/23 11:56 AM    Hello, our patient No patient name on file. has had flu/COVID/RSV vaccines completed/performed. Please assist in updating the patient chart by making an External outreach to Dr. Nancy Pitts for the flu and COVID vaccines. Patient had the RSV vaccine at [address] [place] [address] located in [facility]. Thank you,  Tanya Lou PG  AURY      The Covid and flu vaccines were done at the office of Dr. Nancy Pitts.   The RSV vaccine was done at [facility])

## 2023-11-10 NOTE — TELEPHONE ENCOUNTER
Upon review of the In Basket request and the patient's chart, initial outreach has been made via fax to facility. Please see Contacts section for details.      Thank you  Godwin Briones

## 2023-11-10 NOTE — LETTER
Vaccination Request Form: [id number] aka SARS-CoV-2 (Kerri Elks or [place] or [employer]) and Influenza      Date Requested: 23  Patient: Jose Busby  Patient : 1945   Referring Provider: Manan Hrao DO       The above patient has informed us that they have had their   most recent COVID-19 aka SARS-CoV-2 (Kerri Elks or [place] or [employer]) and Influenza administered at your facility. Please   complete this form and attach all corresponding documentation. Date of Vaccine(s) Given  ______________________________    Lot Number(s) _______________________________________    Manufacture(s) ______________________________________    Dose Amount (s) _____________________________________    Expiration Date(s) ____________________________________    Comments __________________________________________________________  ____________________________________________________________________  ____________________________________________________________________  ____________________________________________________________________    Administering Facility  ________________________________________________    Vaccine Administered By (print name) ___________________________________      Form Completed By (print name) _______________________________________      Signature ___________________________________________________________      These reports are needed for  compliance. Please fax this completed form and a copy of the Vaccine Document(s) to our office located at [address] [place] [address] as soon as possible to Fax [phone] marcellus Ribeiro: Phone [phone]    We thank you for your assistance in treating our mutual patient.     2

## 2023-11-13 NOTE — TELEPHONE ENCOUNTER
As a follow-up, a second attempt has been made for outreach via fax to facility. Please see Contacts section for details.     Thank you  Katie Kwan

## 2023-11-14 NOTE — TELEPHONE ENCOUNTER
Upon review of the In Basket request we were able to locate, review, and update the patient chart as requested for Immunization(s) Covid and Influenza vaccines. Any additional questions or concerns should be emailed to the Practice Liaisons via the appropriate education email address, please do not reply via In Basket.     Thank you  Debra Marcos

## 2023-11-15 LAB — HBA1C MFR BLD HPLC: 5.9 %

## 2024-02-01 ENCOUNTER — TELEPHONE (OUTPATIENT)
Dept: ADMINISTRATIVE | Facility: OTHER | Age: 79
End: 2024-02-01

## 2024-02-01 NOTE — TELEPHONE ENCOUNTER
02/01/24 1:09 PM    Patient contacted (spoke with patient) to bring Advance Directive, POLST, or Living Will document to next scheduled pcp visit.    Thank you.  Rukhsana Dolan  [facility]

## 2024-02-02 ENCOUNTER — OFFICE VISIT (OUTPATIENT)
Dept: FAMILY MEDICINE CLINIC | Facility: CLINIC | Age: 79
End: 2024-02-02
Payer: MEDICARE

## 2024-02-02 VITALS
SYSTOLIC BLOOD PRESSURE: 112 MMHG | WEIGHT: 130.8 LBS | HEIGHT: 63 IN | RESPIRATION RATE: 16 BRPM | BODY MASS INDEX: 23.18 KG/M2 | OXYGEN SATURATION: 98 % | HEART RATE: 86 BPM | DIASTOLIC BLOOD PRESSURE: 72 MMHG

## 2024-02-02 DIAGNOSIS — G89.29 CHRONIC NONINTRACTABLE HEADACHE, UNSPECIFIED HEADACHE TYPE: Primary | ICD-10-CM

## 2024-02-02 DIAGNOSIS — R51.9 CHRONIC NONINTRACTABLE HEADACHE, UNSPECIFIED HEADACHE TYPE: Primary | ICD-10-CM

## 2024-02-02 DIAGNOSIS — R03.0 ELEVATED BLOOD PRESSURE READING IN OFFICE WITHOUT DIAGNOSIS OF HYPERTENSION: ICD-10-CM

## 2024-02-02 PROCEDURE — 99214 OFFICE O/P EST MOD 30 MIN: CPT | Performed by: FAMILY MEDICINE

## 2024-02-02 RX ORDER — ALENDRONATE SODIUM 70 MG/1
70 TABLET ORAL
COMMUNITY
Start: 2023-12-11
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
## 2024-02-02 NOTE — PATIENT INSTRUCTIONS
Hydration and journal  Obtain MRI brain  Watch blood pressure  Let me know how it goes with the above

## 2024-02-12 ENCOUNTER — HOSPITAL ENCOUNTER (OUTPATIENT)
Dept: MRI IMAGING | Facility: HOSPITAL | Age: 79
Discharge: HOME | End: 2024-02-12
Payer: COMMERCIAL

## 2024-02-12 DIAGNOSIS — R51.9 CHRONIC NONINTRACTABLE HEADACHE, UNSPECIFIED HEADACHE TYPE: ICD-10-CM

## 2024-02-12 DIAGNOSIS — G89.29 CHRONIC NONINTRACTABLE HEADACHE, UNSPECIFIED HEADACHE TYPE: ICD-10-CM

## 2024-02-12 PROCEDURE — 70551 MRI BRAIN STEM W/O DYE: CPT

## 2024-02-25 ENCOUNTER — PATIENT MESSAGE (OUTPATIENT)
Dept: FAMILY MEDICINE CLINIC | Facility: CLINIC | Age: 79
End: 2024-02-25

## 2024-10-01 ENCOUNTER — RA CDI HCC (OUTPATIENT)
Dept: OTHER | Facility: HOSPITAL | Age: 79
End: 2024-10-01

## 2024-10-01 NOTE — PROGRESS NOTES
HCC coding opportunities       Chart reviewed, no opportunity found: CHART REVIEWED, NO OPPORTUNITY FOUND        Patients Insurance        Commercial Insurance: [employer]

## 2024-10-08 ENCOUNTER — OFFICE VISIT (OUTPATIENT)
Dept: FAMILY MEDICINE CLINIC | Facility: CLINIC | Age: 79
End: 2024-10-08
Payer: MEDICARE

## 2024-10-08 VITALS
RESPIRATION RATE: 16 BRPM | WEIGHT: 126 LBS | DIASTOLIC BLOOD PRESSURE: 90 MMHG | OXYGEN SATURATION: 99 % | HEIGHT: 63 IN | TEMPERATURE: 97.6 F | BODY MASS INDEX: 22.32 KG/M2 | HEART RATE: 77 BPM | SYSTOLIC BLOOD PRESSURE: 140 MMHG

## 2024-10-08 DIAGNOSIS — M81.0 AGE-RELATED OSTEOPOROSIS WITHOUT CURRENT PATHOLOGICAL FRACTURE: ICD-10-CM

## 2024-10-08 DIAGNOSIS — Z13.220 SCREENING FOR LIPOID DISORDERS: ICD-10-CM

## 2024-10-08 DIAGNOSIS — R73.01 IFG (IMPAIRED FASTING GLUCOSE): ICD-10-CM

## 2024-10-08 DIAGNOSIS — Z00.00 MEDICARE ANNUAL WELLNESS VISIT, SUBSEQUENT: Primary | Chronic | ICD-10-CM

## 2024-10-08 DIAGNOSIS — R03.0 ELEVATED BLOOD PRESSURE READING IN OFFICE WITHOUT DIAGNOSIS OF HYPERTENSION: ICD-10-CM

## 2024-10-08 DIAGNOSIS — Z85.850 HISTORY OF THYROID CANCER: ICD-10-CM

## 2024-10-08 PROBLEM — N18.31 STAGE 3A CHRONIC KIDNEY DISEASE (HCC): Status: RESOLVED | Noted: 2022-09-02 | Resolved: 2024-10-08

## 2024-10-08 PROCEDURE — G0439 PPPS, SUBSEQ VISIT: HCPCS | Performed by: FAMILY MEDICINE

## 2024-10-08 RX ORDER — CLOBETASOL PROPIONATE 0.5 MG/G
CREAM TOPICAL
COMMUNITY
Start: 2024-07-31

## 2024-10-08 RX ORDER — MAGNESIUM 30 MG
30 TABLET ORAL 2 TIMES DAILY
COMMUNITY

## 2024-10-08 NOTE — PROGRESS NOTES
Ambulatory Visit  Name: Mayra Chris      : 1945      MRN: [id number]  Encounter Provider: Raiza Arroyo DO  Encounter Date: 10/8/2024   Encounter department: [facility]    Assessment & Plan  Medicare annual wellness visit, subsequent  Healthy diet and exercise  Covid/flu shots next week  Check labs  Otherwise up to date preventively       Screening for lipoid disorders    Orders:    Lipid panel; Future    IFG (impaired fasting glucose)    Orders:    Hemoglobin A1C; Future    Elevated blood pressure reading in office without diagnosis of hypertension  Continue to monitor pressures at home and call if they are running >140/90  Orders:    CBC and differential; Future    Comprehensive metabolic panel; Future    Age-related osteoporosis without current pathological fracture  Tolerating folsamax  Following with roberto       History of thyroid cancer  Continue f/u with endo         Depression Screening and Follow-up Plan: Patient was screened for depression during today's encounter. They screened negative with a PHQ-2 score of 0.    Urinary Incontinence Plan of Care: counseling topics discussed: limiting fluid intake 3-4 hours before bed.       Preventive health issues were discussed with patient, and age appropriate screening tests were ordered as noted in patient's After Visit Summary. Personalized health advice and appropriate referrals for health education or preventive services given if needed, as noted in patient's After Visit Summary.    History of Present Illness     HPI   Patient Care Team:  Raiza Arroyo DO as PCP - General (Family Medicine)    Pt presents for MAW.    Preventively, pt is doing well.  Golfing.  Looks forward to skiing.  Getting flu/covid shots next week.  Up to date on mammogram/crc screening.  Will be due for dexa and is following with roberto for same.    From a problem standpoint:    Seeing endo for hx thyroid CA and her osteoporosis.  Keeping active.      Follows  with cardiology.  No chest pain, palps    Blood pressure elevated today, but she does follow it and home and it is never this high.      Review of Systems   Constitutional:  Negative for chills, fatigue, fever and unexpected weight change.   HENT:  Negative for congestion, ear pain, hearing loss, postnasal drip, rhinorrhea, sinus pressure, sinus pain, sore throat, trouble swallowing and voice change.    Eyes:  Negative for pain, redness and visual disturbance.   Respiratory:  Negative for cough and shortness of breath.    Cardiovascular:  Negative for chest pain, palpitations and leg swelling.   Gastrointestinal:  Negative for abdominal pain, constipation, diarrhea and nausea.   Endocrine: Negative for cold intolerance, heat intolerance, polydipsia and polyuria.   Genitourinary:  Negative for dysuria, frequency and urgency.   Musculoskeletal:  Negative for arthralgias, joint swelling and myalgias.   Skin:  Negative for rash.        No suspicious lesions   Neurological:  Negative for weakness, numbness and headaches.   Hematological:  Negative for adenopathy.     Medical History Reviewed by provider this encounter:       Annual Wellness Visit Questionnaire   Mayra is here for her Subsequent Wellness visit.     Health Risk Assessment:   Patient rates overall health as excellent. Patient feels that their physical health rating is same. Patient is very satisfied with their life. Eyesight was rated as same. Hearing was rated as same. Patient feels that their emotional and mental health rating is same. Patients states they are never, rarely angry. Patient states they are sometimes unusually tired/fatigued. Pain experienced in the last 7 days has been none. Patient states that she has experienced no weight loss or gain in last 6 months.     Depression Screening:   PHQ-2 Score: 0      Fall Risk Screening:   In the past year, patient has experienced: no history of falling in past year      Urinary Incontinence Screening:    Patient has leaked urine accidently in the last six months.     Home Safety:  Patient does not have trouble with stairs inside or outside of their home. Patient has working smoke alarms and has working carbon monoxide detector. Home safety hazards include: none.     Nutrition:   Current diet is Regular.     Medications:   Patient is currently taking over-the-counter supplements. OTC medications include: see medication list. Patient is able to manage medications.     Activities of Daily Living (ADLs)/Instrumental Activities of Daily Living (IADLs):   Walk and transfer into and out of bed and chair?: Yes  Dress and groom yourself?: Yes    Bathe or shower yourself?: Yes    Feed yourself? Yes  Do your laundry/housekeeping?: Yes  Manage your money, pay your bills and track your expenses?: Yes  Make your own meals?: Yes    Do your own shopping?: Yes    Durable Medical Equipment Suppliers  N/A    Previous Hospitalizations:   Any hospitalizations or ED visits within the last 12 months?: No      Advance Care Planning:   Living will: Yes    Durable POA for healthcare: Yes    Advanced directive: Yes      Cognitive Screening:   Provider or family/friend/caregiver concerned regarding cognition?: No    PREVENTIVE SCREENINGS      Cardiovascular Screening:    General: Screening Current      Diabetes Screening:     General: Screening Current      Colorectal Cancer Screening:     General: Screening Current      Breast Cancer Screening:     General: Screening Current      Cervical Cancer Screening:    General: Screening Not Indicated      Osteoporosis Screening:    General: Screening Not Indicated, History Osteoporosis and Screening Current      Abdominal Aortic Aneurysm (AAA) Screening:        General: Screening Not Indicated      Lung Cancer Screening:     General: Screening Not Indicated      Hepatitis C Screening:    General: Screening Current    Screening, Brief Intervention, and Referral to Treatment  (SBIRT)    Screening  Typical number of drinks in a day: 1  Typical number of drinks in a week: 5  Interpretation: Low risk drinking behavior.    AUDIT-C Screenin) How often did you have a drink containing alcohol in the past year? 4 or more times a week  2) How many drinks did you have on a typical day when you were drinking in the past year? 1 to 2  3) How often did you have 6 or more drinks on one occasion in the past year? never    AUDIT-C Score: 4  Interpretation: Score 3-12 (female): POSITIVE screen for alcohol misuse    AUDIT Screenin) How often during the last year have you found that you were not able to stop drinking once you had started? 0 - never  5) How often during the last year have you failed to do what was normally expected from you because of drinking? 0 - never  6) How often during the last year have you needed a first drink in the morning to get yourself going after a heavy drinking session? 0 - never  7) How often during the last year have you had a feeling of guilt or remorse after drinking? 0 - never  8) How often during the last year have you been unable to remember what happened the night before because you had been drinking? 0 - never  9) Have you or someone else been injured as a result of your drinking? 0 - no  10) Has a relative or friend or a doctor or another health worker been concerned about your drinking or suggested you cut down? 0 - no    AUDIT Score: 4  Interpretation: Low risk alcohol consumption    Single Item Drug Screening:  How often have you used an illegal drug (including marijuana) or a prescription medication for non-medical reasons in the past year? never    Single Item Drug Screen Score: 0  Interpretation: Negative screen for possible drug use disorder    Social Determinants of Health     Financial Resource Strain: Low Risk  (2023)    Overall Financial Resource Strain (CARDIA)     Difficulty of Paying Living Expenses: Not hard at all   Food Insecurity:  "No Food Insecurity (10/7/2024)    Hunger Vital Sign     Worried About Running Out of Food in the Last Year: Never true     Ran Out of Food in the Last Year: Never true   Transportation Needs: No Transportation Needs (10/7/2024)    PRAPARE - Transportation     Lack of Transportation (Medical): No     Lack of Transportation (Non-Medical): No   Housing Stability: Low Risk  (10/7/2024)    Housing Stability Vital Sign     Unable to Pay for Housing in the Last Year: No     Number of Times Moved in the Last Year: 0     Homeless in the Last Year: No   Utilities: Not At Risk (10/7/2024)    [facility] Utilities     Threatened with loss of utilities: No     No results found.    Objective     /90 (BP Location: Left arm, Patient Position: Sitting, Cuff Size: Standard)   Pulse 77   Temp 97.6 °F (36.4 °C) (Tympanic)   Resp 16   Ht 5' 2.5\" (1.588 m)   Wt 57.2 kg (126 lb)   SpO2 99%   BMI 22.68 kg/m²     Physical Exam  Constitutional:       Appearance: Normal appearance.   HENT:      Head: Normocephalic and atraumatic.      Right Ear: Tympanic membrane, ear canal and external ear normal.      Left Ear: Tympanic membrane, ear canal and external ear normal.      Nose: Nose normal. No congestion.      Mouth/Throat:      Mouth: Mucous membranes are moist.      Pharynx: No oropharyngeal exudate or posterior oropharyngeal erythema.   Eyes:      Extraocular Movements: Extraocular movements intact.      Conjunctiva/sclera: Conjunctivae normal.      Pupils: Pupils are equal, round, and reactive to light.   Neck:      Vascular: No carotid bruit.   Cardiovascular:      Rate and Rhythm: Normal rate and regular rhythm.      Heart sounds: No murmur heard.     No friction rub. No gallop.   Pulmonary:      Effort: Pulmonary effort is normal.      Breath sounds: No wheezing, rhonchi or rales.   Abdominal:      General: Abdomen is flat. There is no distension.      Palpations: Abdomen is soft.      Tenderness: There is no abdominal tenderness. "   Musculoskeletal:      Cervical back: Neck supple.   Lymphadenopathy:      Cervical: No cervical adenopathy.   Neurological:      General: No focal deficit present.      Mental Status: She is alert and oriented to person, place, and time.      Cranial Nerves: No cranial nerve deficit.      Motor: No weakness.      Deep Tendon Reflexes: Reflexes normal.

## 2024-10-08 NOTE — PATIENT INSTRUCTIONS
Labs in November  Continue f/u with cardiology and endo  Covid/flu shot      Medicare Preventive Visit Patient Instructions  Thank you for completing your Welcome to Medicare Visit or Medicare Annual Wellness Visit today. Your next wellness visit will be due in one year (10/9/2025).  The screening/preventive services that you may require over the next 5-10 years are detailed below. Some tests may not apply to you based off risk factors and/or age. Screening tests ordered at today's visit but not completed yet may show as past due. Also, please note that scanned in results may not display below.  Preventive Screenings:  Service Recommendations Previous Testing/Comments   Colorectal Cancer Screening  * Colonoscopy    * Fecal Occult Blood Test (FOBT)/Fecal Immunochemical Test (FIT)  * Fecal DNA/Cologuard Test  * Flexible Sigmoidoscopy Age: 45-75 years old   Colonoscopy: every 10 years (may be performed more frequently if at higher risk)  OR  FOBT/FIT: every 1 year  OR  Cologuard: every 3 years  OR  Sigmoidoscopy: every 5 years  Screening may be recommended earlier than age 45 if at higher risk for colorectal cancer. Also, an individualized decision between you and your healthcare provider will decide whether screening between the ages of 76-85 would be appropriate. Colonoscopy: 02/14/2023  FOBT/FIT: Not on file  Cologuard: Not on file  Sigmoidoscopy: Not on file    Screening Current     Breast Cancer Screening Age: 40+ years old  Frequency: every 1-2 years  Not required if history of left and right mastectomy Mammogram: 09/12/2023    Screening Current   Cervical Cancer Screening Between the ages of 21-29, pap smear recommended once every 3 years.   Between the ages of 30-65, can perform pap smear with HPV co-testing every 5 years.   Recommendations may differ for women with a history of total hysterectomy, cervical cancer, or abnormal pap smears in past. Pap Smear: Not on file    Screening Not Indicated   Hepatitis C  Screening Once for adults born between 1945 and 1965  More frequently in patients at high risk for Hepatitis C Hep C Antibody: 06/09/2017    Screening Current   Diabetes Screening 1-2 times per year if you're at risk for diabetes or have pre-diabetes Fasting glucose: 92 mg/dL (9/6/2022)  A1C: 5.9 (11/15/2023)  Screening Current   Cholesterol Screening Once every 5 years if you don't have a lipid disorder. May order more often based on risk factors. Lipid panel: 09/06/2022    Screening Current     Other Preventive Screenings Covered by Medicare:  Abdominal Aortic Aneurysm (AAA) Screening: covered once if your at risk. You're considered to be at risk if you have a family history of AAA.  Lung Cancer Screening: covers low dose CT scan once per year if you meet all of the following conditions: (1) Age 55-77; (2) No signs or symptoms of lung cancer; (3) Current smoker or have quit smoking within the last 15 years; (4) You have a tobacco smoking history of at least 20 pack years (packs per day multiplied by number of years you smoked); (5) You get a written order from a healthcare provider.  Glaucoma Screening: covered annually if you're considered high risk: (1) You have diabetes OR (2) Family history of glaucoma OR (3)  aged 50 and older OR (4)  American aged 65 and older  Osteoporosis Screening: covered every 2 years if you meet one of the following conditions: (1) You're estrogen deficient and at risk for osteoporosis based off medical history and other findings; (2) Have a vertebral abnormality; (3) On glucocorticoid therapy for more than 3 months; (4) Have primary hyperparathyroidism; (5) On osteoporosis medications and need to assess response to drug therapy.   Last bone density test (DXA Scan): 12/21/2022.  HIV Screening: covered annually if you're between the age of 15-65. Also covered annually if you are younger than 15 and older than 65 with risk factors for HIV infection. For pregnant  patients, it is covered up to 3 times per pregnancy.    Immunizations:  Immunization Recommendations   Influenza Vaccine Annual influenza vaccination during flu season is recommended for all persons aged >= 6 months who do not have contraindications   Pneumococcal Vaccine   * Pneumococcal conjugate vaccine = PCV13 (Prevnar 13), PCV15 (Vaxneuvance), PCV20 (Prevnar 20)  * Pneumococcal polysaccharide vaccine = PPSV23 (Pneumovax) Adults 19-63 yo with certain risk factors or if 65+ yo  If never received any pneumonia vaccine: recommend Prevnar 20 (PCV20)  Give PCV20 if previously received 1 dose of PCV13 or PPSV23   Hepatitis B Vaccine 3 dose series if at intermediate or high risk (ex: diabetes, end stage renal disease, liver disease)   Respiratory syncytial virus (RSV) Vaccine - COVERED BY MEDICARE PART D  * RSVPreF3 (Arexvy) CDC recommends that adults 60 years of age and older may receive a single dose of RSV vaccine using shared clinical decision-making (SCDM)   Tetanus (Td) Vaccine - COST NOT COVERED BY MEDICARE PART B Following completion of primary series, a booster dose should be given every 10 years to maintain immunity against tetanus. Td may also be given as tetanus wound prophylaxis.   Tdap Vaccine - COST NOT COVERED BY MEDICARE PART B Recommended at least once for all adults. For pregnant patients, recommended with each pregnancy.   Shingles Vaccine (Shingrix) - COST NOT COVERED BY MEDICARE PART B  2 shot series recommended in those 19 years and older who have or will have weakened immune systems or those 50 years and older     Health Maintenance Due:      Topic Date Due    DXA SCAN  12/21/2023    Breast Cancer Screening: Mammogram  09/13/2025    Colorectal Cancer Screening  02/14/2028    Hepatitis C Screening  Completed     Immunizations Due:      Topic Date Due    Influenza Vaccine (1) 09/01/2024    COVID-19 Vaccine (8 - 2023-24 season) 09/01/2024     Advance Directives   What are advance directives?   Advance directives are legal documents that state your wishes and plans for medical care. These plans are made ahead of time in case you lose your ability to make decisions for yourself. Advance directives can apply to any medical decision, such as the treatments you want, and if you want to donate organs.   What are the types of advance directives?  There are many types of advance directives, and each state has rules about how to use them. You may choose a combination of any of the following:  Living will:  This is a written record of the treatment you want. You can also choose which treatments you do not want, which to limit, and which to stop at a certain time. This includes surgery, medicine, IV fluid, and tube feedings.   Durable power of  for healthcare (DPAHC):  This is a written record that states who you want to make healthcare choices for you when you are unable to make them for yourself. This person, called a proxy, is usually a family member or a friend. You may choose more than 1 proxy.  Do not resuscitate (DNR) order:  A DNR order is used in case your heart stops beating or you stop breathing. It is a request not to have certain forms of treatment, such as CPR. A DNR order may be included in other types of advance directives.  Medical directive:  This covers the care that you want if you are in a coma, near death, or unable to make decisions for yourself. You can list the treatments you want for each condition. Treatment may include pain medicine, surgery, blood transfusions, dialysis, IV or tube feedings, and a ventilator (breathing machine).  Values history:  This document has questions about your views, beliefs, and how you feel and think about life. This information can help others choose the care that you would choose.  Why are advance directives important?  An advance directive helps you control your care. Although spoken wishes may be used, it is better to have your wishes written down.  Spoken wishes can be misunderstood, or not followed. Treatments may be given even if you do not want them. An advance directive may make it easier for your family to make difficult choices about your care.   Urinary Incontinence   Urinary incontinence (UI)  is when you lose control of your bladder. UI develops because your bladder cannot store or empty urine properly. The 3 most common types of UI are stress incontinence, urge incontinence, or both.  Medicines:   May be given to help strengthen your bladder control. Report any side effects of medication to your healthcare provider.  Do pelvic muscle exercises often:  Your pelvic muscles help you stop urinating. Squeeze these muscles tight for 5 seconds, then relax for 5 seconds. Gradually work up to squeezing for 10 seconds. Do 3 sets of 15 repetitions a day, or as directed. This will help strengthen your pelvic muscles and improve bladder control.  Train your bladder:  Go to the bathroom at set times, such as every 2 hours, even if you do not feel the urge to go. You can also try to hold your urine when you feel the urge to go. For example, hold your urine for 5 minutes when you feel the urge to go. As that becomes easier, hold your urine for 10 minutes.   Self-care:   Keep a UI record.  Write down how often you leak urine and how much you leak. Make a note of what you were doing when you leaked urine.  Drink liquids as directed. You may need to limit the amount of liquid you drink to help control your urine leakage. Do not drink any liquid right before you go to bed. Limit or do not have drinks that contain caffeine or alcohol.   Prevent constipation.  Eat a variety of high-fiber foods. Good examples are high-fiber cereals, beans, vegetables, and whole-grain breads. Walking is the best way to trigger your intestines to have a bowel movement.  Exercise regularly and maintain a healthy weight.  Weight loss and exercise will decrease pressure on your bladder and help  "you control your leakage.   Use a catheter as directed  to help empty your bladder. A catheter is a tiny, plastic tube that is put into your bladder to drain your urine.   Go to behavior therapy as directed.  Behavior therapy may be used to help you learn to control your urge to urinate.    Alcohol Use and Your Health    Drinking too much can harm your health.  Excessive alcohol use leads to about 88,000 death in the [place] each year, and shortens the life of those who diet by almost 30 years.  Further, excessive drinking cost the economy $249 billion in 2010.  Most excessive drinkers are not alcohol dependent.    Excessive alcohol use has immediate effects that increase the risk of many harmful health conditions.  These are most often the result of binge drinking.  Over time, excessive alcohol use can lead to the development of chronic diseases and other series health problems.    What is considered a \"drink\"?        Excessive alcohol use includes:  Binge Drinking: For women, 4 or more drinks consumed on one occasion. For men, 5 or more drinks consumed on one occasion.  Heavy Drinking: For women, 8 or more drinks per week. For men, 15 or more drinks per week  Any alcohol used by pregnant women  Any alcohol used by those under the age of 21 years    If you choose to drink, do so in moderation:  Do not drink at all if you are under the age of 21, or if you are or may be pregnant, or have health problems that could be made worse by drinking.  For women, up to 1 drink per day  For men, up to 2 drinks a day    No one should begin drinking or drink more frequently based on potential health benefits    Short-Term Health Risks:  Injuries: motor vehicle crashes, falls, drownings, burns  Violence: homicide, suicide, sexual assault, intimate partner violence  Alcohol poisoning  Reproductive health: risky sexual behaviors, unintended prengnacy, sexually transmitted diseases, miscarriage, stillbirth, fetal alcohol " syndrome    Long-Term Health Risks:  Chronic diseases: high blood pressure, heart disease, stroke, liver disease, digestive problems  Cancers: breast, mouth and throat, liver, colon  Learning and memory problems: dementia, poor school performance  Mental health: depression, anxiety, insomnia  Social problems: lost productivity, family problems, unemployment  Alcohol dependence    For support and more information:  Substance Abuse and Mental Health Services Administration  [address]  [place] [address]  Web Address: http://www.[facility].gov    Alcoholics Anonymous        Web Address: http://www.aa.org    https://www.cdc.gov/alcohol/fact-sheets/alcohol-use.htm     © Copyright [employer] 2018 Information is for End User's use only and may not be sold, redistributed or otherwise used for commercial purposes. All illustrations and images included in CareNotes® are the copyrighted property of [employer]., [employer] or [employer]

## 2024-12-04 ENCOUNTER — RESULTS FOLLOW-UP (OUTPATIENT)
Dept: FAMILY MEDICINE CLINIC | Facility: CLINIC | Age: 79
End: 2024-12-04

## 2024-12-04 ENCOUNTER — APPOINTMENT (OUTPATIENT)
Dept: LAB | Facility: MEDICAL CENTER | Age: 79
End: 2024-12-04
Payer: MEDICARE

## 2024-12-04 DIAGNOSIS — R73.01 IFG (IMPAIRED FASTING GLUCOSE): ICD-10-CM

## 2024-12-04 DIAGNOSIS — Z13.220 SCREENING FOR LIPOID DISORDERS: ICD-10-CM

## 2024-12-04 DIAGNOSIS — R03.0 ELEVATED BLOOD PRESSURE READING IN OFFICE WITHOUT DIAGNOSIS OF HYPERTENSION: ICD-10-CM

## 2024-12-04 LAB
ALBUMIN SERPL BCG-MCNC: 4.3 G/DL (ref 3.5–5)
ALP SERPL-CCNC: 62 U/L (ref 34–104)
ALT SERPL W P-5'-P-CCNC: 13 U/L (ref 7–52)
ANION GAP SERPL CALCULATED.3IONS-SCNC: 7 MMOL/L (ref 4–13)
AST SERPL W P-5'-P-CCNC: 15 U/L (ref 13–39)
BASOPHILS # BLD AUTO: 0.04 THOUSANDS/ÂΜL (ref 0–0.1)
BASOPHILS NFR BLD AUTO: 1 % (ref 0–1)
BILIRUB SERPL-MCNC: 1.25 MG/DL (ref 0.2–1)
BUN SERPL-MCNC: 16 MG/DL (ref 5–25)
CALCIUM SERPL-MCNC: 8.7 MG/DL (ref 8.4–10.2)
CHLORIDE SERPL-SCNC: 98 MMOL/L (ref 96–108)
CHOLEST SERPL-MCNC: 173 MG/DL (ref ?–200)
CO2 SERPL-SCNC: 31 MMOL/L (ref 21–32)
CREAT SERPL-MCNC: 0.68 MG/DL (ref 0.6–1.3)
EOSINOPHIL # BLD AUTO: 0.08 THOUSAND/ÂΜL (ref 0–0.61)
EOSINOPHIL NFR BLD AUTO: 2 % (ref 0–6)
ERYTHROCYTE [DISTWIDTH] IN BLOOD BY AUTOMATED COUNT: 12.8 % (ref 11.6–15.1)
EST. AVERAGE GLUCOSE BLD GHB EST-MCNC: 128 MG/DL
GFR SERPL CREATININE-BSD FRML MDRD: 84 ML/MIN/1.73SQ M
GLUCOSE P FAST SERPL-MCNC: 98 MG/DL (ref 65–99)
HBA1C MFR BLD: 6.1 %
HCT VFR BLD AUTO: 43.9 % (ref 34.8–46.1)
HDLC SERPL-MCNC: 64 MG/DL
HGB BLD-MCNC: 14.4 G/DL (ref 11.5–15.4)
IMM GRANULOCYTES # BLD AUTO: 0.03 THOUSAND/UL (ref 0–0.2)
IMM GRANULOCYTES NFR BLD AUTO: 1 % (ref 0–2)
LDLC SERPL CALC-MCNC: 93 MG/DL (ref 0–100)
LYMPHOCYTES # BLD AUTO: 0.86 THOUSANDS/ÂΜL (ref 0.6–4.47)
LYMPHOCYTES NFR BLD AUTO: 16 % (ref 14–44)
MCH RBC QN AUTO: 32.6 PG (ref 26.8–34.3)
MCHC RBC AUTO-ENTMCNC: 32.8 G/DL (ref 31.4–37.4)
MCV RBC AUTO: 99 FL (ref 82–98)
MONOCYTES # BLD AUTO: 0.58 THOUSAND/ÂΜL (ref 0.17–1.22)
MONOCYTES NFR BLD AUTO: 11 % (ref 4–12)
NEUTROPHILS # BLD AUTO: 3.85 THOUSANDS/ÂΜL (ref 1.85–7.62)
NEUTS SEG NFR BLD AUTO: 69 % (ref 43–75)
NONHDLC SERPL-MCNC: 109 MG/DL
NRBC BLD AUTO-RTO: 0 /100 WBCS
PLATELET # BLD AUTO: 211 THOUSANDS/UL (ref 149–390)
PMV BLD AUTO: 10.5 FL (ref 8.9–12.7)
POTASSIUM SERPL-SCNC: 4.2 MMOL/L (ref 3.5–5.3)
PROT SERPL-MCNC: 6.6 G/DL (ref 6.4–8.4)
RBC # BLD AUTO: 4.42 MILLION/UL (ref 3.81–5.12)
SODIUM SERPL-SCNC: 136 MMOL/L (ref 135–147)
TRIGL SERPL-MCNC: 81 MG/DL (ref ?–150)
WBC # BLD AUTO: 5.44 THOUSAND/UL (ref 4.31–10.16)

## 2024-12-04 PROCEDURE — 83036 HEMOGLOBIN GLYCOSYLATED A1C: CPT

## 2024-12-04 PROCEDURE — 80053 COMPREHEN METABOLIC PANEL: CPT

## 2024-12-04 PROCEDURE — 36415 COLL VENOUS BLD VENIPUNCTURE: CPT

## 2024-12-04 PROCEDURE — 80061 LIPID PANEL: CPT

## 2024-12-04 PROCEDURE — 85025 COMPLETE CBC W/AUTO DIFF WBC: CPT

## 2025-06-20 ENCOUNTER — APPOINTMENT (OUTPATIENT)
Dept: RADIOLOGY | Facility: MEDICAL CENTER | Age: 80
End: 2025-06-20
Attending: ORTHOPAEDIC SURGERY
Payer: COMMERCIAL

## 2025-06-20 ENCOUNTER — OFFICE VISIT (OUTPATIENT)
Dept: OBGYN CLINIC | Facility: MEDICAL CENTER | Age: 80
End: 2025-06-20
Payer: COMMERCIAL

## 2025-06-20 DIAGNOSIS — M25.561 RIGHT KNEE PAIN, UNSPECIFIED CHRONICITY: ICD-10-CM

## 2025-06-20 DIAGNOSIS — S80.01XA CONTUSION OF RIGHT KNEE, INITIAL ENCOUNTER: Primary | ICD-10-CM

## 2025-06-20 PROCEDURE — 73564 X-RAY EXAM KNEE 4 OR MORE: CPT

## 2025-06-20 PROCEDURE — 99204 OFFICE O/P NEW MOD 45 MIN: CPT | Performed by: ORTHOPAEDIC SURGERY

## 2025-06-20 NOTE — PROGRESS NOTES
Name: Mayra Chris      : 1945      MRN: [id number]  Encounter Provider: Gina Goodson DO  Encounter Date: 2025   Encounter department[facility]  :  Assessment & Plan  Contusion of right knee, initial encounter    Patient has mild to moderate right knee osteoarthritis, with right knee contusion.   Treatment options were discussed with patient at today's visit.    X-rays were obtained and reviewed at today's visit.  Medications: Tylenol up to 3000 mg per day and OTC NSAID prn pain  Ice, heat, topical gels as needed.  Activity: Continue activity as tolerated.   Discussed with patient she should monitor her symptoms as she just recently sustained this injury.  She may call for PT referral.   If her symptoms worsen she may follow-up for reevaluation. Otherwise patient will follow-up as needed.   Orders:    XR knee 4+ vw right injury; Future        Return if symptoms worsen or fail to improve.    I answered all of the patient's questions during the visit and provided education of the patient's condition during the visit.  The patient verbalized understanding of the information given and agrees with the plan.  This note was dictated using [employer] software.  It may contain errors including improperly dictated words.  Please contact physician directly for any questions.    History of Present Illness   HPI  Chief complaint:   Chief Complaint   Patient presents with    Right Knee - Pain       HPI: Mayra Chris is a 80 y.o. female that c/o right knee pain.    Length of time knee pain has been present: 3 days  Any falls or trauma associated with onset of pain: yes feel on step directly on step  Location of pain: anterior and posterior  Intermittent or constant: intermittent   Description of pain: achy   Aggravating factors: steps   Instability or locking: no  Pain medication that has been tried: tylenol and motrin  Topical mediation that has been tried:  no  Has heat/ice/elevation been tried: yes  Can NSAIDs be taken?  If not why?: yes  Has PT or home exercises been tried?: no  Has bracing been tried? OTC or rx?  no  Have injections been tried?  Steroid/visco?: no  Any history of surgery on that knee?:  no       ROS:    See HPI for musculoskeletal review.   All other systems reviewed are negative     Historical Information   Past Medical History[1]  Past Surgical History[2]  Social History   Social History     Substance and Sexual Activity   Alcohol Use Yes    Alcohol/week: 4.0 - 5.0 standard drinks of alcohol    Types: 4 - 5 Glasses of wine per week     Social History     Substance and Sexual Activity   Drug Use Never     Tobacco Use History[3]  Family History: Family History[4]    Medications Ordered Prior to Encounter[5]  Allergies[6]    Medications Ordered Prior to Encounter[7]      Objective   There were no vitals taken for this visit.       PE:  AAOx 3  WDWN  Hearing intact, no drainage from eyes  Regular rate  no audible wheezing  no abdominal distension  LE compartments soft, skin intact    Ortho Exam:  rightknee:    Appearance:  no swelling   No bruising  no obvious joint deformity   No effusion  Palpation/Tenderness:  +TTP over medial joint line, no TTP over lateral joint line or over patella/patellar tendon  Active Range of Motion:  AROM: 0-120  Special Tests:  Medial Mira's Test:  Positive  Lateral Mira's Test:  Negative  Apley's compression test:  Negative  Lachman's Test:  negative  Anterior Drawer Test:  negative  Valgus Stress Test:  negative  Varus Stress Test:  negative       No ipsilateral hip pain with ROM    rightLE:    Sensation grossly intact  Palpable 2+ pulse  AT/GS/EHL intact    Imaging Studies: Results Review Statement: I personally reviewed the following image studies in PACS and associated radiology reports: xray(s). My interpretation of the radiology images/reports is: Mild to moderate right knee osteoarthritis, no signs of  "acute fractures.    Procedures     Scribe Attestation      I,:  Tim Bolivar am acting as a scribe while in the presence of the attending physician.:       I,:  Gina Goodson DO personally performed the services described in this documentation    as scribed in my presence.:                    [1]   Past Medical History:  Diagnosis Date    Anesthesia     \"after one foot surgery which was later in the day took longer to wake up \"    Basal cell carcinoma of nose 10/2019    Cancer (HCC) 1990    Thyroid cancer    Carcinoma (HCC) 1980    basil cell left ear 1990 and head 2013    Cecal volvulus (HCC) 03/16/2019    Colon polyp     Dental crowns present     one dental implant upper right    Exercises daily     Foot pain, left     OR correction today 3/13/2020    History of mitral valve prolapse     \"no problems\"    Hypothyroidism     post surgical    IFG (impaired fasting glucose)     s/p thyroidectomy and PALOMO    Osteoporosis     Pericardial effusion     \"sees cardiologist regularly, being monitored and no symptoms found incidentally on an echo\"    Thyroid ca (HCC)     Tinnitus     Urinary tract infection 2020    Varicella     Wears glasses     Wears glasses    [2]   Past Surgical History:  Procedure Laterality Date    ABDOMINAL SURGERY  01/01/1980    adhesion removal      APPENDECTOMY      BRANCHIAL CLEFT CYST EXCISION  1980    CATARACT EXTRACTION Bilateral     COLECTOMY Right 03/2019    right ellis    COLON SURGERY  1980    for adhesions    COLONOSCOPY  12/2017    Blake Pantoja MD [facility]  No bx's    COLONOSCOPY  10/2012    ARIELLE Pantoja MD [facility] Normal    FOOT NEUROMA SURGERY Right     R foot 2nd toes      FOOT SURGERY Left     FOOT TENDON SURGERY Left 03/13/2020    Procedure: FDL TENDON TRANSFER;  Surgeon: Ant Urias DPM;  Location: [facility];  Service: Podiatry    MOHS RECONSTRUCTION N/A 10/02/2019    Procedure: NOSE MOHS RECONSTRUCTION;  Surgeon: Jarrod Almonte MD;  Location: [facility];  Service: " Plastics    SKIN CANCER EXCISION      BCC    THYROIDECTOMY      thyroid cancer      TOE OSTEOTOMY Left 2020    Procedure: 2ND MTPJ EXPLOR. W/ REMOVAL RETAINED HARDWARE; 2ND MET OSTEOTOMY, FUSION 2ND TOE, 1ST TOE AND MET OSTEOTOMY; FLEXOR RELEASE 3RD AND 4TH TOES;  Surgeon: Ant Urias DPM;  Location: [facility];  Service: Podiatry    TONSILLECTOMY AND ADENOIDECTOMY      TUBAL LIGATION      VAGINAL HYSTERECTOMY     [3]   Social History  Tobacco Use   Smoking Status Former    Current packs/day: 0.00    Average packs/day: 1.5 packs/day for 8.0 years (12.0 ttl pk-yrs)    Types: Cigarettes    Start date: 1962    Quit date: 1970    Years since quittin.5   Smokeless Tobacco Never   [4]   Family History  Problem Relation Name Age of Onset    Heart disease Mother Corrie     Diabetes type II Father Nirav     Heart disease Father Nirav     Diabetes Father Nirav         Type 2    Thyroid cancer Sister Ally     Osteoarthritis Sister Ally     Thyroid cancer Brother Deuce     Diabetes type II Brother Deuce     Diabetes Brother Deuce         Type 2    Heart disease Brother Deuce     No Known Problems Son Cam     No Known Problems Daughter Rukhsana    [5]   Current Outpatient Medications on File Prior to Visit   Medication Sig Dispense Refill    acetaminophen (TYLENOL) 500 mg tablet Take 500 mg by mouth every 6 (six) hours as needed for mild pain      alendronate (FOSAMAX) 70 mg tablet Take 70 mg by mouth every 7 days      Calcium Carb-Cholecalciferol 500-2.5 MG-MCG CHEW       Cholecalciferol (VITAMIN D) 2000 units tablet Take 2,000 Units by mouth daily      clobetasol (TEMOVATE) 0.05 % cream APPLY TO SKIN TWO TIMES DAILY AS NEEDED      estradiol (ESTRACE) 0.1 mg/g vaginal cream       levothyroxine 88 mcg tablet Take 88 mcg by mouth daily      Lutein 20 MG TABS Take 20 mg by mouth daily      magnesium 30 MG tablet Take 30 mg by mouth 2 (two) times a day      Menaquinone-7 (Vitamin K2) 100  MCG CAPS Take by mouth      Multiple Vitamin (MULTI-VITAMIN DAILY) TABS Take 1 tablet by mouth daily        No current facility-administered medications on file prior to visit.   [6]   Allergies  Allergen Reactions    Latex      Latex gloves caused peeling of finger tips    Phenobarbital GI Intolerance     May be per pt /years and years ago     [7]   Current Outpatient Medications on File Prior to Visit   Medication Sig Dispense Refill    acetaminophen (TYLENOL) 500 mg tablet Take 500 mg by mouth every 6 (six) hours as needed for mild pain      alendronate (FOSAMAX) 70 mg tablet Take 70 mg by mouth every 7 days      Calcium Carb-Cholecalciferol 500-2.5 MG-MCG CHEW       Cholecalciferol (VITAMIN D) 2000 units tablet Take 2,000 Units by mouth daily      clobetasol (TEMOVATE) 0.05 % cream APPLY TO SKIN TWO TIMES DAILY AS NEEDED      estradiol (ESTRACE) 0.1 mg/g vaginal cream       levothyroxine 88 mcg tablet Take 88 mcg by mouth daily      Lutein 20 MG TABS Take 20 mg by mouth daily      magnesium 30 MG tablet Take 30 mg by mouth 2 (two) times a day      Menaquinone-7 (Vitamin K2) 100 MCG CAPS Take by mouth      Multiple Vitamin (MULTI-VITAMIN DAILY) TABS Take 1 tablet by mouth daily        No current facility-administered medications on file prior to visit.

## (undated) DEVICE — STANDARD SURGICAL GOWN, L: Brand: CONVERTORS

## (undated) DEVICE — NEEDLE 25G X 1 1/2

## (undated) DEVICE — GLOVE PI ULTRA TOUCH SZ.8.0

## (undated) DEVICE — SCD SEQUENTIAL COMPRESSION COMFORT SLEEVE MEDIUM KNEE LENGTH: Brand: KENDALL SCD

## (undated) DEVICE — GLOVE PI ULTRA TOUCH SZ.7.0

## (undated) DEVICE — MASTISOL LIQ ADHESIVE 2/3ML

## (undated) DEVICE — PENCIL ELECTROSURG E-Z CLEAN -0035H

## (undated) DEVICE — GAUZE SPONGES,USP TYPE VII GAUZE, 12 PLY: Brand: CURITY

## (undated) DEVICE — SUT ETHILON 4-0 PS-2 18 IN 1667H

## (undated) DEVICE — DRILL KIT FOR BME HAMMERLOCK2 SYSTEM: Brand: HAMMERLOCK2

## (undated) DEVICE — HEWSON SUTURE RETRIEVER: Brand: HEWSON SUTURE RETRIEVER

## (undated) DEVICE — NEEDLE 18 G X 1 1/2

## (undated) DEVICE — CUFF TOURNIQUET 18 X 4 IN QUICK CONNECT DISP 1 BLADDER

## (undated) DEVICE — GLOVE PI ULTRA TOUCH SZ.7.5

## (undated) DEVICE — DRILL BIT 2.7MM CALIBRATED

## (undated) DEVICE — GLOVE PI ULTRA TOUCH SZ 6

## (undated) DEVICE — ARTHREX WIRE 0.062 IN ORTHO TROC TIP
Type: IMPLANTABLE DEVICE | Site: FOOT | Status: NON-FUNCTIONAL
Removed: 2020-03-13

## (undated) DEVICE — SUT VICRYL 3-0 SH 27 IN J416H

## (undated) DEVICE — SAW BLADE MICRO SAGGITAL 4.5MM X 25.5 X .024

## (undated) DEVICE — BLADE SAGITTAL 25.6 X 9.5MM

## (undated) DEVICE — BETHLEHEM UNIVERSAL MINOR GEN: Brand: CARDINAL HEALTH

## (undated) DEVICE — STOCKINETTE REGULAR

## (undated) DEVICE — DRILL KIT FOR SPEED HAND AND WRIST SYSTEM: Brand: SPEED HAND AND WRIST

## (undated) DEVICE — SUT VICRYL 3-0 REEL 54 IN J285G

## (undated) DEVICE — STRETCH BANDAGE: Brand: CURITY

## (undated) DEVICE — 10FR FRAZIER SUCTION HANDLE: Brand: CARDINAL HEALTH

## (undated) DEVICE — ACE WRAP 4 IN UNSTERILE

## (undated) DEVICE — UNDYED MONOFILAMENT (POLYDIOXANONE), ABSORBABLE SURGICAL SUTURE: Brand: PDS

## (undated) DEVICE — 3M™ STERI-STRIP™ REINFORCED ADHESIVE SKIN CLOSURES, R1547, 1/2 IN X 4 IN (12 MM X 100 MM), 6 STRIPS/ENVELOPE: Brand: 3M™ STERI-STRIP™

## (undated) DEVICE — 2000CC GUARDIAN II: Brand: GUARDIAN

## (undated) DEVICE — 3M™ STERI-STRIP™ REINFORCED ADHESIVE SKIN CLOSURES, R1546, 1/4 IN X 4 IN (6 MM X 100 MM), 10 STRIPS/ENVELOPE: Brand: 3M™ STERI-STRIP™

## (undated) DEVICE — SUT VICRYL 2-0 CT-2 27 IN J269H

## (undated) DEVICE — DRAPE C-ARM X-RAY

## (undated) DEVICE — GLOVE INDICATOR PI UNDERGLOVE SZ 8 BLUE

## (undated) DEVICE — ELECTRODE NEEDLE MEGAFINE 2IN E-Z CLEAN MEGADYNE -0118

## (undated) DEVICE — WEBRIL 6 IN UNSTERILE

## (undated) DEVICE — TIBURON SPLIT SHEET: Brand: CONVERTORS

## (undated) DEVICE — GLOVE INDICATOR PI UNDERGLOVE SZ 6.5 BLUE

## (undated) DEVICE — SUT VICRYL 4-0 SH 27 IN J415H

## (undated) DEVICE — ACE WRAP 6 IN UNSTERILE

## (undated) DEVICE — DRAPE C-ARMOUR

## (undated) DEVICE — WIRE 0.86MM TROCAR TIP

## (undated) DEVICE — TWIST DRILL 2.7MM DIA 127.0MM LONG

## (undated) DEVICE — CHLORAPREP HI-LITE 26ML ORANGE

## (undated) DEVICE — SYRINGE 10ML LL

## (undated) DEVICE — CAST PADDING 4 IN SYNTHETIC NON-STRL

## (undated) DEVICE — GAUZE SPONGES,16 PLY: Brand: CURITY

## (undated) DEVICE — OCCLUSIVE GAUZE STRIP,3% BISMUTH TRIBROMOPHENATE IN PETROLATUM BLEND: Brand: XEROFORM

## (undated) DEVICE — TWIST DRILL 3.2MM DIA 127.0MM LONG

## (undated) DEVICE — BETHLEHEM UNIVERSAL  MIONR EXT: Brand: CARDINAL HEALTH

## (undated) DEVICE — TUBING SUCTION 5MM X 12 FT

## (undated) DEVICE — INTENDED FOR TISSUE SEPARATION, AND OTHER PROCEDURES THAT REQUIRE A SHARP SURGICAL BLADE TO PUNCTURE OR CUT.: Brand: BARD-PARKER ® CARBON RIB-BACK BLADES

## (undated) DEVICE — SUT PROLENE 6-0 P-3 18 IN 8695G

## (undated) DEVICE — KERLIX BANDAGE ROLL: Brand: KERLIX